# Patient Record
Sex: MALE | Race: WHITE | NOT HISPANIC OR LATINO | Employment: OTHER | URBAN - METROPOLITAN AREA
[De-identification: names, ages, dates, MRNs, and addresses within clinical notes are randomized per-mention and may not be internally consistent; named-entity substitution may affect disease eponyms.]

---

## 2018-08-29 ENCOUNTER — OFFICE VISIT (OUTPATIENT)
Dept: ORTHOPEDIC SURGERY | Facility: CLINIC | Age: 83
End: 2018-08-29

## 2018-08-29 VITALS — HEART RATE: 67 BPM | OXYGEN SATURATION: 98 % | WEIGHT: 173.06 LBS | BODY MASS INDEX: 24.78 KG/M2 | HEIGHT: 70 IN

## 2018-08-29 DIAGNOSIS — M16.11 PRIMARY OSTEOARTHRITIS OF RIGHT HIP: ICD-10-CM

## 2018-08-29 DIAGNOSIS — M70.61 TROCHANTERIC BURSITIS OF RIGHT HIP: Primary | ICD-10-CM

## 2018-08-29 PROCEDURE — 99203 OFFICE O/P NEW LOW 30 MIN: CPT | Performed by: ORTHOPAEDIC SURGERY

## 2018-08-29 PROCEDURE — 20610 DRAIN/INJ JOINT/BURSA W/O US: CPT | Performed by: ORTHOPAEDIC SURGERY

## 2018-08-29 RX ORDER — LISINOPRIL AND HYDROCHLOROTHIAZIDE 12.5; 1 MG/1; MG/1
TABLET ORAL
Refills: 2 | COMMUNITY
Start: 2018-07-23

## 2018-08-29 RX ORDER — LIDOCAINE HYDROCHLORIDE 10 MG/ML
4 INJECTION, SOLUTION INFILTRATION; PERINEURAL
Status: COMPLETED | OUTPATIENT
Start: 2018-08-29 | End: 2018-08-29

## 2018-08-29 RX ORDER — ATORVASTATIN CALCIUM 20 MG/1
TABLET, FILM COATED ORAL
Refills: 3 | COMMUNITY
Start: 2018-06-27

## 2018-08-29 RX ORDER — DICLOFENAC SODIUM 75 MG/1
TABLET, DELAYED RELEASE ORAL
COMMUNITY
Start: 2015-05-27

## 2018-08-29 RX ORDER — DOXAZOSIN MESYLATE 4 MG/1
TABLET ORAL
Refills: 3 | COMMUNITY
Start: 2018-06-27 | End: 2022-06-20

## 2018-08-29 RX ORDER — TRIAMCINOLONE ACETONIDE 40 MG/ML
40 INJECTION, SUSPENSION INTRA-ARTICULAR; INTRAMUSCULAR
Status: COMPLETED | OUTPATIENT
Start: 2018-08-29 | End: 2018-08-29

## 2018-08-29 RX ORDER — NITROGLYCERIN 0.4 MG/1
TABLET SUBLINGUAL
Refills: 3 | COMMUNITY
Start: 2018-06-12

## 2018-08-29 RX ORDER — BISOPROLOL FUMARATE 5 MG/1
TABLET, FILM COATED ORAL DAILY
Refills: 2 | COMMUNITY
Start: 2018-07-23

## 2018-08-29 RX ORDER — OMEPRAZOLE 40 MG/1
CAPSULE, DELAYED RELEASE ORAL
Refills: 0 | COMMUNITY
Start: 2018-07-23 | End: 2022-06-20

## 2018-08-29 RX ADMIN — TRIAMCINOLONE ACETONIDE 40 MG: 40 INJECTION, SUSPENSION INTRA-ARTICULAR; INTRAMUSCULAR at 11:03

## 2018-08-29 RX ADMIN — LIDOCAINE HYDROCHLORIDE 4 ML: 10 INJECTION, SOLUTION INFILTRATION; PERINEURAL at 11:03

## 2018-08-29 NOTE — PROGRESS NOTES
[facility] [place] [facility] Note    Subjective     Chief Complaint   Patient presents with   • Right Hip - Pain        HPI    Nicole Olvera is a 82 y.o. male.  He presents today for evaluation of right hip pain.  He does have pain in the lateral aspect of hip, as well as in the groin.  Pain is worsening over the past 3 months.  He is ambulatory without external aids.  Pain is mild to moderate currently, aching, burning and sharp, associated with stiffness, and worsens with walking.  He also has pain when he sleeps on his side at night.      There is no problem list on file for this patient.    Past Medical History:   Diagnosis Date   • Heart disease    • Hypertension    • Osteoarthritis       Past Surgical History:   Procedure Laterality Date   • CARDIAC SURGERY        Family History   Problem Relation Age of Onset   • Stroke Father    • Osteoarthritis Father      Social History     Social History   • Marital status:      Spouse name: N/A   • Number of children: N/A   • Years of education: N/A     Occupational History   • Not on file.     Social History Main Topics   • Smoking status: Former Smoker     Quit date: 1978   • Smokeless tobacco: Never Used   • Alcohol use Yes   • Drug use: No   • Sexual activity: Defer     Other Topics Concern   • Not on file     Social History Narrative   • No narrative on file      No current outpatient prescriptions on file prior to visit.     No current facility-administered medications on file prior to visit.       Allergies   Allergen Reactions   • Antihistamines, Chlorpheniramine-Type Other (See Comments)        Review of Systems   Constitutional: Positive for activity change and fatigue. Negative for appetite change, chills, diaphoresis, fever and unexpected weight change.   HENT: Positive for hearing loss and tinnitus. Negative for congestion, dental problem, drooling, ear discharge, ear pain, facial swelling, mouth sores, nosebleeds, postnasal drip, rhinorrhea,  "sinus pressure, sneezing, sore throat, trouble swallowing and voice change.    Eyes: Negative for photophobia, pain, discharge, redness, itching and visual disturbance.   Respiratory: Negative for apnea, cough, choking, chest tightness, shortness of breath, wheezing and stridor.    Cardiovascular: Positive for leg swelling. Negative for chest pain and palpitations.   Gastrointestinal: Negative for abdominal distention, abdominal pain, anal bleeding, blood in stool, constipation, diarrhea, nausea, rectal pain and vomiting.   Endocrine: Positive for cold intolerance. Negative for heat intolerance, polydipsia, polyphagia and polyuria.   Genitourinary: Negative for decreased urine volume, difficulty urinating, dysuria, enuresis, flank pain, frequency, genital sores, hematuria and urgency.   Musculoskeletal: Positive for back pain, gait problem and neck pain. Negative for arthralgias, joint swelling, myalgias and neck stiffness.   Skin: Negative for color change, pallor, rash and wound.   Allergic/Immunologic: Negative for environmental allergies, food allergies and immunocompromised state.   Neurological: Negative for dizziness, tremors, seizures, syncope, facial asymmetry, speech difficulty, weakness, light-headedness, numbness and headaches.   Hematological: Negative for adenopathy. Does not bruise/bleed easily.   Psychiatric/Behavioral: Negative for agitation, behavioral problems, confusion, decreased concentration, dysphoric mood, hallucinations, self-injury, sleep disturbance and suicidal ideas. The patient is not nervous/anxious and is not hyperactive.         Objective      Physical Exam  Pulse 67   Ht 176.5 cm (69.5\")   Wt 78.5 kg (173 lb 1 oz)   SpO2 98%   BMI 25.19 kg/m²     Body mass index is 25.19 kg/m².    General:   Mental Status:  Alert   Appearance: Cooperative, in no acute distress   Build and Nutrition: Well-nourished and well developed male   Orientation: Alert and oriented to person, place and " time   Posture: Normal   Gait: Normal    Integument:   Right hip: No skin lesions, no rash, no ecchymosis    Neurologic:   Sensation:    Right foot: Intact to light touch on the dorsal and plantar aspect   Motor:  Right lower extremity: 5/5 quadriceps, hamstrings, ankle dorsiflexors, and ankle plantar flexors    Vascular:   Right lower extremity: 2+ dorsalis pedis pulse, prompt capillary refill    Lower Extremities:   Right Hip:    Tenderness:  Tender over the lateral aspect of the hip    Swelling: None    Crepitus:  None    Atrophy:  None    Range of motion:  External Rotation: 30°       Internal Rotation: 30°       Flexion:  100°       Extension:  0°   Instability:  None  Deformities:  None  Functional testing: Positive [facility]    No leg length discrepancy        Imaging/Studies      Imaging Results (last 24 hours)     Procedure Component Value Units Date/Time    XR Hip With or Without Pelvis 1 View Right [[id number]] Resulted:  08/29/18 1045     Updated:  08/29/18 1046    Narrative:       Right Hip Radiographs  Indication: right hip pain  Views: low AP pelvis and lateral of the right hip    Comparison: no prior studies available for review    Findings:   Mild arthritic changes, with inferior acetabular osteophytes, joint space   narrowing, mild subchondral sclerosis, with no acute bony abnormalities.          Assessment and Plan     Nicole was seen today for pain.    Diagnoses and all orders for this visit:    Trochanteric bursitis of right hip  -     XR Hip With or Without Pelvis 1 View Right  -     Large Joint Arthrocentesis  -     lidocaine (XYLOCAINE) 1 % injection 4 mL; 4 mL Once.  -     triamcinolone acetonide (KENALOG-40) injection 40 mg; 40 mg Once.  -     Ambulatory Referral to Physical Therapy Evaluate and treat    Primary osteoarthritis of right hip        I reviewed my findings with patient today.  He has findings consistent with trochanteric bursitis, as well as mild arthritis in the hip.  At this  point, we will try some physical therapy and a trochanteric injection.  I will see him back in 6 weeks, but sooner for any problems.    Of note, he had 50% relief just a few minutes following the injection.    Return in about 6 weeks (around 10/10/2018).      Medical Decision Making  Management Options : prescription/IM medicine and physical/occupational therapy  Data/Risk: radiology tests and independent visualization of imaging, lab tests, or EMG/NCV      Fabien Howard MD  08/29/18  7:44 PM

## 2018-08-29 NOTE — PROGRESS NOTES
Procedure   Large Joint Arthrocentesis  Date/Time: 8/29/2018 11:03 AM  Consent given by: patient  Site marked: site marked  Timeout: Immediately prior to procedure a time out was called to verify the correct patient, procedure, equipment, support staff and site/side marked as required   Supporting Documentation  Indications: pain   Procedure Details  Location: hip - R greater trochanteric bursa  Preparation: Patient was prepped and draped in the usual sterile fashion  Needle size: 22 G  Approach: posterior  Medications administered: 40 mg triamcinolone acetonide 40 MG/ML; 4 mL lidocaine 1 %  Patient tolerance: patient tolerated the procedure well with no immediate complications

## 2018-10-29 ENCOUNTER — OFFICE VISIT (OUTPATIENT)
Dept: ORTHOPEDIC SURGERY | Facility: CLINIC | Age: 83
End: 2018-10-29

## 2018-10-29 VITALS — WEIGHT: 163.14 LBS | OXYGEN SATURATION: 98 % | HEIGHT: 69 IN | HEART RATE: 70 BPM | BODY MASS INDEX: 24.16 KG/M2

## 2018-10-29 DIAGNOSIS — M70.61 TROCHANTERIC BURSITIS OF RIGHT HIP: Primary | ICD-10-CM

## 2018-10-29 PROCEDURE — 99212 OFFICE O/P EST SF 10 MIN: CPT | Performed by: ORTHOPAEDIC SURGERY

## 2018-10-29 NOTE — PROGRESS NOTES
[facility] [place] [facility] [place]    Subjective     Chief Complaint   Patient presents with   • Right Hip - Follow-up     9 weeks        HPI    Nicole Olvera is a 83 y.o. male.  He follows up today for his right hip.  He responded well to the trochanteric injection as well as physical therapy.  No pain.      There is no problem list on file for this patient.    Past Medical History:   Diagnosis Date   • Heart disease    • Hypertension    • Osteoarthritis       Past Surgical History:   Procedure Laterality Date   • CARDIAC SURGERY        Family History   Problem Relation Age of Onset   • Stroke Father    • Osteoarthritis Father      Social History     Social History   • Marital status:      Spouse name: N/A   • Number of children: N/A   • Years of education: N/A     Occupational History   • Not on file.     Social History Main Topics   • Smoking status: Former Smoker     Quit date: 1978   • Smokeless tobacco: Never Used   • Alcohol use Yes   • Drug use: No   • Sexual activity: Defer     Other Topics Concern   • Not on file     Social History Narrative   • No narrative on file      Current Outpatient Prescriptions on File Prior to Visit   Medication Sig Dispense Refill   • atorvastatin (LIPITOR) 20 MG tablet TK 1 T PO QHS  3   • bisoprolol (ZEBeta) 5 MG tablet Take  by mouth Daily.  2   • diclofenac (VOLTAREN) 75 MG EC tablet diclofenac sodium 75 mg tablet,delayed release   Two times a day     • doxazosin (CARDURA) 4 MG tablet TK ONE T PO QD  3   • lisinopril-hydrochlorothiazide (PRINZIDE,ZESTORETIC) 10-12.5 MG per tablet TK ONE T PO QD  2   • nitroglycerin (NITROSTAT) 0.4 MG SL tablet TK 1 T SUBLINGUALLY AT ONSET OF CHEST PAIN AND REPEAT Q 5 MINUTES FOR 3 DOSES IF NEEDED  3   • omeprazole (priLOSEC) 40 MG capsule TK 1 C PO QD  0     No current facility-administered medications on file prior to visit.       Allergies   Allergen Reactions   • Antihistamines, Chlorpheniramine-Type Other (See Comments)         Review of Systems   Constitutional: Negative for activity change, appetite change, chills, diaphoresis, fatigue, fever and unexpected weight change.   HENT: Negative for congestion, dental problem, drooling, ear discharge, ear pain, facial swelling, hearing loss, mouth sores, nosebleeds, postnasal drip, rhinorrhea, sinus pressure, sneezing, sore throat, tinnitus, trouble swallowing and voice change.    Eyes: Negative for photophobia, pain, discharge, redness, itching and visual disturbance.   Respiratory: Negative for apnea, cough, choking, chest tightness, shortness of breath, wheezing and stridor.    Cardiovascular: Negative for chest pain, palpitations and leg swelling.   Gastrointestinal: Negative for abdominal distention, abdominal pain, anal bleeding, blood in stool, constipation, diarrhea, nausea, rectal pain and vomiting.   Endocrine: Negative for cold intolerance, heat intolerance, polydipsia, polyphagia and polyuria.   Genitourinary: Negative for decreased urine volume, difficulty urinating, dysuria, enuresis, flank pain, frequency, genital sores, hematuria and urgency.   Musculoskeletal: Positive for arthralgias. Negative for back pain, gait problem, joint swelling, myalgias, neck pain and neck stiffness.   Skin: Negative for color change, pallor, rash and wound.   Allergic/Immunologic: Negative for environmental allergies, food allergies and immunocompromised state.   Neurological: Negative for dizziness, tremors, seizures, syncope, facial asymmetry, speech difficulty, weakness, light-headedness, numbness and headaches.   Hematological: Negative for adenopathy. Does not bruise/bleed easily.   Psychiatric/Behavioral: Negative for agitation, behavioral problems, confusion, decreased concentration, dysphoric mood, hallucinations, self-injury, sleep disturbance and suicidal ideas. The patient is not nervous/anxious and is not hyperactive.         Objective      Physical Exam  Pulse 70   Ht 176.5 cm  "(69.49\")   Wt 74 kg (163 lb 2.3 oz)   SpO2 98%   BMI 23.75 kg/m²     Body mass index is 23.75 kg/m².    General:   Mental Status:  Alert   Appearance: Cooperative, in no acute distress   Build and Nutrition: Well-nourished and well developed male   Orientation: Alert and oriented to person, place and time   Posture: Normal   Gait: Normal    Lower Extremity:   Right Hip:    Tenderness:  None    Swelling:  None    Crepitus:  None    Range of motion:  External Rotation: 30°       Internal Rotation: 30°       Flexion:  100°       Extension:  0°    Deformities:  None  Functional testing: Negative [facility] leg length discrepancy        Assessment and Plan     Nicole was seen today for follow-up.    Diagnoses and all orders for this visit:    Trochanteric bursitis of right hip        I reviewed my findings with patient today.  Right hip is doing well, and he should continue with maintenance exercises.  I will see him back if there are any problems with this hip in the future.  Repeat injection can be considered if he has recurrent symptoms.    Return if symptoms worsen or fail to improve.        Fabien Howard MD  10/29/18  9:05 AM            "

## 2019-03-13 ENCOUNTER — OFFICE VISIT (OUTPATIENT)
Dept: ORTHOPEDIC SURGERY | Facility: CLINIC | Age: 84
End: 2019-03-13

## 2019-03-13 VITALS — OXYGEN SATURATION: 98 % | BODY MASS INDEX: 24.82 KG/M2 | HEIGHT: 69 IN | HEART RATE: 56 BPM | WEIGHT: 167.55 LBS

## 2019-03-13 DIAGNOSIS — M70.61 TROCHANTERIC BURSITIS OF RIGHT HIP: Primary | ICD-10-CM

## 2019-03-13 PROCEDURE — 20610 DRAIN/INJ JOINT/BURSA W/O US: CPT | Performed by: ORTHOPAEDIC SURGERY

## 2019-03-13 RX ORDER — LIDOCAINE HYDROCHLORIDE 10 MG/ML
4 INJECTION, SOLUTION INFILTRATION; PERINEURAL
Status: COMPLETED | OUTPATIENT
Start: 2019-03-13 | End: 2019-03-13

## 2019-03-13 RX ORDER — TRIAMCINOLONE ACETONIDE 40 MG/ML
40 INJECTION, SUSPENSION INTRA-ARTICULAR; INTRAMUSCULAR
Status: COMPLETED | OUTPATIENT
Start: 2019-03-13 | End: 2019-03-13

## 2019-03-13 RX ADMIN — TRIAMCINOLONE ACETONIDE 40 MG: 40 INJECTION, SUSPENSION INTRA-ARTICULAR; INTRAMUSCULAR at 16:27

## 2019-03-13 RX ADMIN — LIDOCAINE HYDROCHLORIDE 4 ML: 10 INJECTION, SOLUTION INFILTRATION; PERINEURAL at 16:27

## 2019-03-13 NOTE — PROGRESS NOTES
[facility] [place] [facility]    Subjective     Chief Complaint   Patient presents with   • Follow-up     5 month f/u Right Hip pain (injection given 18)        TASHIA Olvera is a 83 y.o. male.  He follows up today for his right hip.  He is having pain on the lateral aspect of the hip, and had good relief of the trochanteric injection in 2018.  He would like to have another injection today.      There is no problem list on file for this patient.    Past Medical History:   Diagnosis Date   • Heart disease    • Hypertension    • Osteoarthritis       Past Surgical History:   Procedure Laterality Date   • CARDIAC SURGERY        Family History   Problem Relation Age of Onset   • Stroke Father    • Osteoarthritis Father      Social History     Socioeconomic History   • Marital status:      Spouse name: Not on file   • Number of children: Not on file   • Years of education: Not on file   • Highest education level: Not on file   Social Needs   • Financial resource strain: Not on file   • Food insecurity - worry: Not on file   • Food insecurity - inability: Not on file   • Transportation needs - medical: Not on file   • Transportation needs - non-medical: Not on file   Occupational History   • Not on file   Tobacco Use   • Smoking status: Former Smoker     Last attempt to quit: 1978     Years since quittin.2   • Smokeless tobacco: Never Used   Substance and Sexual Activity   • Alcohol use: Yes   • Drug use: No   • Sexual activity: Defer   Other Topics Concern   • Not on file   Social History Narrative   • Not on file      Current Outpatient Medications on File Prior to Visit   Medication Sig Dispense Refill   • atorvastatin (LIPITOR) 20 MG tablet TK 1 T PO QHS  3   • bisoprolol (ZEBeta) 5 MG tablet Take  by mouth Daily.  2   • diclofenac (VOLTAREN) 75 MG EC tablet diclofenac sodium 75 mg tablet,delayed release   Two times a day     • doxazosin (CARDURA) 4 MG tablet TK ONE T PO QD  3    • lisinopril-hydrochlorothiazide (PRINZIDE,ZESTORETIC) 10-12.5 MG per tablet TK ONE T PO QD  2   • nitroglycerin (NITROSTAT) 0.4 MG SL tablet TK 1 T SUBLINGUALLY AT ONSET OF CHEST PAIN AND REPEAT Q 5 MINUTES FOR 3 DOSES IF NEEDED  3   • omeprazole (priLOSEC) 40 MG capsule TK 1 C PO QD  0     No current facility-administered medications on file prior to visit.       Allergies   Allergen Reactions   • Antihistamines, Chlorpheniramine-Type Other (See Comments)        Review of Systems   Constitutional: Negative for activity change, appetite change, chills, diaphoresis, fatigue, fever and unexpected weight change.   HENT: Negative for congestion, dental problem, drooling, ear discharge, ear pain, facial swelling, hearing loss, mouth sores, nosebleeds, postnasal drip, rhinorrhea, sinus pressure, sneezing, sore throat, tinnitus, trouble swallowing and voice change.    Eyes: Negative for photophobia, pain, discharge, redness, itching and visual disturbance.   Respiratory: Negative for apnea, cough, choking, chest tightness, shortness of breath, wheezing and stridor.    Cardiovascular: Negative for chest pain, palpitations and leg swelling.   Gastrointestinal: Negative for abdominal distention, abdominal pain, anal bleeding, blood in stool, constipation, diarrhea, nausea, rectal pain and vomiting.   Endocrine: Negative for cold intolerance, heat intolerance, polydipsia, polyphagia and polyuria.   Genitourinary: Negative for decreased urine volume, difficulty urinating, dysuria, enuresis, flank pain, frequency, genital sores, hematuria and urgency.   Musculoskeletal: Positive for arthralgias (hip pain). Negative for back pain, gait problem, joint swelling, myalgias, neck pain and neck stiffness.   Skin: Negative for color change, pallor, rash and wound.   Allergic/Immunologic: Negative for environmental allergies, food allergies and immunocompromised state.   Neurological: Negative for dizziness, tremors, seizures,  "syncope, facial asymmetry, speech difficulty, weakness, light-headedness, numbness and headaches.   Hematological: Negative for adenopathy. Does not bruise/bleed easily.   Psychiatric/Behavioral: Negative for agitation, behavioral problems, confusion, decreased concentration, dysphoric mood, hallucinations, self-injury, sleep disturbance and suicidal ideas. The patient is not nervous/anxious and is not hyperactive.         Objective      Physical Exam  Pulse 56   Ht 176.5 cm (69.49\")   Wt 76 kg (167 lb 8.8 oz)   SpO2 98%   BMI 24.40 kg/m²     Body mass index is 24.4 kg/m².    General:   Mental Status:  Alert   Appearance: Cooperative, in no acute distress   Build and Nutrition: Well-nourished well-developed male   Orientation: Alert and oriented to person, place and time   Posture: Normal    Integument:   Right hip: No skin lesions, no rash, no ecchymosis    Lower Extremity:   Right Hip:    Tenderness:  Tender over the greater trochanter    Swelling:  None    Crepitus:  None    Range of motion:  External Rotation: 30°       Internal Rotation: 30°       Flexion:  100°       Extension:  0°    Deformities:  None  Functional testing: Negative [facility]    No leg length discrepancy        Assessment and Plan     Nicole was seen today for follow-up.    Diagnoses and all orders for this visit:    Trochanteric bursitis of right hip  -     Large Joint Arthrocentesis: R greater trochanteric bursa        1. Trochanteric bursitis of right hip        I reviewed my findings with the patient today.  He would like to have another injection for trochanteric bursitis today, and this was provided.  Of note, he had 60% relief just a few minutes following the injection.  I will see him back in 4 months, but sooner for any problems.    Return in about 4 months (around 7/13/2019).      Medical Decision Making  Management Options : prescription/IM medicine      Fabien Howard MD  03/13/19  4:54 PM  "

## 2019-03-13 NOTE — PROGRESS NOTES
Procedure   Large Joint Arthrocentesis: R greater trochanteric bursa  Date/Time: 3/13/2019 4:27 PM  Consent given by: patient  Site marked: site marked  Timeout: Immediately prior to procedure a time out was called to verify the correct patient, procedure, equipment, support staff and site/side marked as required   Supporting Documentation  Indications: pain   Procedure Details  Location: hip - R greater trochanteric bursa  Preparation: Patient was prepped and draped in the usual sterile fashion  Needle size: 22 G  Approach: anterolateral  Medications administered: 4 mL lidocaine 1 %; 40 mg triamcinolone acetonide 40 MG/ML

## 2019-08-28 ENCOUNTER — OFFICE VISIT (OUTPATIENT)
Dept: ORTHOPEDIC SURGERY | Facility: CLINIC | Age: 84
End: 2019-08-28

## 2019-08-28 VITALS — HEART RATE: 96 BPM | BODY MASS INDEX: 22.86 KG/M2 | HEIGHT: 69 IN | WEIGHT: 154.32 LBS | OXYGEN SATURATION: 98 %

## 2019-08-28 DIAGNOSIS — M70.61 TROCHANTERIC BURSITIS OF RIGHT HIP: Primary | ICD-10-CM

## 2019-08-28 PROCEDURE — 99213 OFFICE O/P EST LOW 20 MIN: CPT | Performed by: ORTHOPAEDIC SURGERY

## 2019-08-28 PROCEDURE — 20610 DRAIN/INJ JOINT/BURSA W/O US: CPT | Performed by: ORTHOPAEDIC SURGERY

## 2019-08-28 RX ORDER — LIDOCAINE HYDROCHLORIDE 10 MG/ML
4 INJECTION, SOLUTION EPIDURAL; INFILTRATION; INTRACAUDAL; PERINEURAL
Status: COMPLETED | OUTPATIENT
Start: 2019-08-28 | End: 2019-08-28

## 2019-08-28 RX ORDER — TRIAMCINOLONE ACETONIDE 40 MG/ML
40 INJECTION, SUSPENSION INTRA-ARTICULAR; INTRAMUSCULAR
Status: COMPLETED | OUTPATIENT
Start: 2019-08-28 | End: 2019-08-28

## 2019-08-28 RX ORDER — LEVOTHYROXINE SODIUM 0.03 MG/1
TABLET ORAL
COMMUNITY
End: 2022-06-20

## 2019-08-28 RX ORDER — POTASSIUM CHLORIDE 750 MG/1
TABLET, FILM COATED, EXTENDED RELEASE ORAL DAILY
Refills: 2 | COMMUNITY
Start: 2019-07-24

## 2019-08-28 RX ORDER — FUROSEMIDE 40 MG/1
TABLET ORAL
COMMUNITY

## 2019-08-28 RX ADMIN — TRIAMCINOLONE ACETONIDE 40 MG: 40 INJECTION, SUSPENSION INTRA-ARTICULAR; INTRAMUSCULAR at 15:10

## 2019-08-28 RX ADMIN — LIDOCAINE HYDROCHLORIDE 4 ML: 10 INJECTION, SOLUTION EPIDURAL; INFILTRATION; INTRACAUDAL; PERINEURAL at 15:10

## 2019-08-28 NOTE — PROGRESS NOTES
[facility] [place] [facility] Note    Subjective     Chief Complaint   Patient presents with   • Right Hip - Follow-up, Pain     Trochanteric Bursitis of Right Hip  Cortisone Injection given 2019         HPI    Nicole Olvera is a 83 y.o. male.  He follows up today for his right hip.  He would like to have an injection today if possible.  Previous injection did help.  Pain is currently 3 out of 10, located on the lateral aspect of the hip, which is dull in quality, and worsens with walking.      There is no problem list on file for this patient.    Past Medical History:   Diagnosis Date   • Heart disease    • Hypertension    • Osteoarthritis       Past Surgical History:   Procedure Laterality Date   • CARDIAC SURGERY        Family History   Problem Relation Age of Onset   • Stroke Father    • Osteoarthritis Father      Social History     Socioeconomic History   • Marital status:      Spouse name: Not on file   • Number of children: Not on file   • Years of education: Not on file   • Highest education level: Not on file   Tobacco Use   • Smoking status: Former Smoker     Last attempt to quit:      Years since quittin.6   • Smokeless tobacco: Never Used   Substance and Sexual Activity   • Alcohol use: Yes   • Drug use: No   • Sexual activity: Defer      Current Outpatient Medications on File Prior to Visit   Medication Sig Dispense Refill   • atorvastatin (LIPITOR) 20 MG tablet TK 1 T PO QHS  3   • bisoprolol (ZEBeta) 5 MG tablet Take  by mouth Daily.  2   • CoenzymeQ10-Isoleucine-Glycine (CO Q-10) 100-50-25 MG tablet sustained-release 24 hour Co Q-10   qd     • diclofenac (VOLTAREN) 75 MG EC tablet diclofenac sodium 75 mg tablet,delayed release   Two times a day     • doxazosin (CARDURA) 4 MG tablet TK ONE T PO QD  3   • furosemide (LASIX) 40 MG tablet furosemide 40 mg tablet     • levothyroxine (SYNTHROID, LEVOTHROID) 25 MCG tablet levothyroxine 25 mcg tablet   TAKE 1 TABLET BY MOUTH EVERY  DAY     • lisinopril-hydrochlorothiazide (PRINZIDE,ZESTORETIC) 10-12.5 MG per tablet TK ONE T PO QD  2   • nitroglycerin (NITROSTAT) 0.4 MG SL tablet TK 1 T SUBLINGUALLY AT ONSET OF CHEST PAIN AND REPEAT Q 5 MINUTES FOR 3 DOSES IF NEEDED  3   • omeprazole (priLOSEC) 40 MG capsule TK 1 C PO QD  0   • potassium chloride (K-DUR) 10 MEQ CR tablet Take  by mouth Daily.  2     No current facility-administered medications on file prior to visit.       Allergies   Allergen Reactions   • Antihistamines, Chlorpheniramine-Type Other (See Comments)        Review of Systems   Constitutional: Negative.  Negative for activity change, appetite change, chills, diaphoresis, fatigue, fever and unexpected weight change.   HENT: Negative.  Negative for congestion, dental problem, drooling, ear discharge, ear pain, facial swelling, hearing loss, mouth sores, nosebleeds, postnasal drip, rhinorrhea, sinus pressure, sneezing, sore throat, tinnitus, trouble swallowing and voice change.    Eyes: Negative.  Negative for photophobia, pain, discharge, redness, itching and visual disturbance.   Respiratory: Negative.  Negative for apnea, cough, choking, chest tightness, shortness of breath, wheezing and stridor.    Cardiovascular: Negative.  Negative for chest pain, palpitations and leg swelling.   Gastrointestinal: Negative.  Negative for abdominal distention, abdominal pain, anal bleeding, blood in stool, constipation, diarrhea, nausea, rectal pain and vomiting.   Endocrine: Negative.  Negative for cold intolerance, heat intolerance, polydipsia, polyphagia and polyuria.   Genitourinary: Negative.  Negative for decreased urine volume, difficulty urinating, dysuria, enuresis, flank pain, frequency, genital sores, hematuria and urgency.   Musculoskeletal: Positive for arthralgias. Negative for back pain, gait problem, joint swelling, myalgias, neck pain and neck stiffness.   Skin: Negative.  Negative for color change, pallor, rash and wound.  "  Allergic/Immunologic: Negative.  Negative for environmental allergies, food allergies and immunocompromised state.   Neurological: Negative.  Negative for dizziness, tremors, seizures, syncope, facial asymmetry, speech difficulty, weakness, light-headedness, numbness and headaches.   Hematological: Negative.  Negative for adenopathy. Does not bruise/bleed easily.   Psychiatric/Behavioral: Negative.  Negative for agitation, behavioral problems, confusion, decreased concentration, dysphoric mood, hallucinations, self-injury, sleep disturbance and suicidal ideas. The patient is not nervous/anxious and is not hyperactive.         Objective      Physical Exam  Pulse 96   Ht 176.5 cm (69.49\")   Wt 70 kg (154 lb 5.2 oz)   SpO2 98%   BMI 22.47 kg/m²     Body mass index is 22.47 kg/m².    General:   Mental Status:  Alert   Appearance: Cooperative, in no acute distress   Build and Nutrition: Well-nourished well-developed male   Orientation: Alert and oriented to person, place and time   Posture: Normal   Gait: Normal    Integument:   Right hip: No skin lesions, no rash, no ecchymosis    Lower Extremity:   Right Hip:    Tenderness:  Lateral tenderness, mild    Swelling:  None    Crepitus:  None    Range of motion:  External Rotation: 30°       Internal Rotation: 30°       Flexion:  100°       Extension:  0°    Deformities:  None  Functional testing: Negative [facility]    No leg length discrepancy      Imaging/Studies  Imaging Results (last 24 hours)     Procedure Component Value Units Date/Time    XR Hip With or Without Pelvis 1 View Right [[id number]] Resulted:  08/28/19 1503     Updated:  08/28/19 1503    Narrative:       Right Hip Radiographs  Indication: right hip pain  Views: low AP pelvis and lateral of the right hip    Comparison: 8/29/2018    Findings:   No significant changes compared to previous films, with minor degeneration   in the hip, with good alignment.  No acute bony abnormalities.    "         Assessment and Plan     Nicole was seen today for follow-up and pain.    Diagnoses and all orders for this visit:    Trochanteric bursitis of right hip  -     XR Hip With or Without Pelvis 1 View Right        1. Trochanteric bursitis of right hip        I reviewed my findings with patient today.  He is bothered with right hip bursitis, would like to have an injection today, which was provided.  I will see him back in 4 months, but sooner for any problems.    Of note, he had 10% relief just a few minutes following the injection today.    Return in about 4 months (around 12/28/2019).      Medical Decision Making  Management Options : prescription/IM medicine  Data/Risk: radiology tests and independent visualization of imaging, lab tests, or EMG/NCV      Fabien Howard MD  08/28/19  3:09 PM

## 2019-08-28 NOTE — PROGRESS NOTES
Procedure   Large Joint Arthrocentesis: R greater trochanteric bursa  Date/Time: 8/28/2019 3:10 PM  Consent given by: patient  Site marked: site marked  Timeout: Immediately prior to procedure a time out was called to verify the correct patient, procedure, equipment, support staff and site/side marked as required   Supporting Documentation  Indications: pain   Procedure Details  Location: hip - R greater trochanteric bursa  Preparation: Patient was prepped and draped in the usual sterile fashion  Needle size: 22 G  Approach: anterolateral  Medications administered: 4 mL lidocaine PF 1% 1 %; 40 mg triamcinolone acetonide 40 MG/ML  Patient tolerance: patient tolerated the procedure well with no immediate complications

## 2019-12-02 ENCOUNTER — OFFICE VISIT (OUTPATIENT)
Dept: ORTHOPEDIC SURGERY | Facility: CLINIC | Age: 84
End: 2019-12-02

## 2019-12-02 VITALS — BODY MASS INDEX: 23.71 KG/M2 | HEIGHT: 69 IN | WEIGHT: 160.05 LBS | HEART RATE: 62 BPM | OXYGEN SATURATION: 98 %

## 2019-12-02 DIAGNOSIS — M70.61 TROCHANTERIC BURSITIS OF RIGHT HIP: Primary | ICD-10-CM

## 2019-12-02 PROCEDURE — 20610 DRAIN/INJ JOINT/BURSA W/O US: CPT | Performed by: ORTHOPAEDIC SURGERY

## 2019-12-02 RX ORDER — TRIAMCINOLONE ACETONIDE 40 MG/ML
40 INJECTION, SUSPENSION INTRA-ARTICULAR; INTRAMUSCULAR
Status: COMPLETED | OUTPATIENT
Start: 2019-12-02 | End: 2019-12-02

## 2019-12-02 RX ORDER — BRIMONIDINE TARTRATE 2 MG/ML
SOLUTION/ DROPS OPHTHALMIC
Refills: 2 | COMMUNITY
Start: 2019-09-20

## 2019-12-02 RX ORDER — PREDNISOLONE ACETATE 10 MG/ML
SUSPENSION/ DROPS OPHTHALMIC
Refills: 2 | COMMUNITY
Start: 2019-11-08

## 2019-12-02 RX ORDER — KETOROLAC TROMETHAMINE 5 MG/ML
SOLUTION OPHTHALMIC
Refills: 2 | COMMUNITY
Start: 2019-11-08

## 2019-12-02 RX ORDER — ROPIVACAINE HYDROCHLORIDE 5 MG/ML
4 INJECTION, SOLUTION EPIDURAL; INFILTRATION; PERINEURAL
Status: COMPLETED | OUTPATIENT
Start: 2019-12-02 | End: 2019-12-02

## 2019-12-02 RX ADMIN — TRIAMCINOLONE ACETONIDE 40 MG: 40 INJECTION, SUSPENSION INTRA-ARTICULAR; INTRAMUSCULAR at 14:59

## 2019-12-02 RX ADMIN — ROPIVACAINE HYDROCHLORIDE 4 ML: 5 INJECTION, SOLUTION EPIDURAL; INFILTRATION; PERINEURAL at 14:59

## 2019-12-02 NOTE — PROGRESS NOTES
[facility] [place] [facility] [place]    Subjective     Chief Complaint   Patient presents with   • Follow-up     4 month follow up - Trochanteric bursitis of right hip - injection given 19        HPI    Nicole Olvera is a 84 y.o. male.  He follows up today for his right hip.  Last injection provided relief up until just a few weeks ago, and he would like to have a repeat injection today.  Previous injections have provided good relief.      There is no problem list on file for this patient.    Past Medical History:   Diagnosis Date   • Heart disease    • Hypertension    • Osteoarthritis       Past Surgical History:   Procedure Laterality Date   • CARDIAC SURGERY     • CATARACT EXTRACTION, BILATERAL        Family History   Problem Relation Age of Onset   • Stroke Father    • Osteoarthritis Father      Social History     Socioeconomic History   • Marital status:      Spouse name: Not on file   • Number of children: Not on file   • Years of education: Not on file   • Highest education level: Not on file   Tobacco Use   • Smoking status: Former Smoker     Last attempt to quit:      Years since quittin.9   • Smokeless tobacco: Never Used   Substance and Sexual Activity   • Alcohol use: Yes   • Drug use: No   • Sexual activity: Defer      Current Outpatient Medications on File Prior to Visit   Medication Sig Dispense Refill   • atorvastatin (LIPITOR) 20 MG tablet TK 1 T PO QHS  3   • bisoprolol (ZEBeta) 5 MG tablet Take  by mouth Daily.  2   • brimonidine (ALPHAGAN) 0.2 % ophthalmic solution INSTILL 1 DROP IN BOTH EYES BID  2   • CoenzymeQ10-Isoleucine-Glycine (CO Q-10) 100-50-25 MG tablet sustained-release 24 hour Co Q-10   qd     • diclofenac (VOLTAREN) 75 MG EC tablet diclofenac sodium 75 mg tablet,delayed release   Two times a day     • doxazosin (CARDURA) 4 MG tablet TK ONE T PO QD  3   • furosemide (LASIX) 40 MG tablet furosemide 40 mg tablet     • ketorolac (ACULAR) 0.5 % ophthalmic  solution   2   • levothyroxine (SYNTHROID, LEVOTHROID) 25 MCG tablet levothyroxine 25 mcg tablet   TAKE 1 TABLET BY MOUTH EVERY DAY     • lisinopril-hydrochlorothiazide (PRINZIDE,ZESTORETIC) 10-12.5 MG per tablet TK ONE T PO QD  2   • Nepafenac (ILEVRO) 0.3 % suspension Ilevro 0.3 % eye drops,suspension     • nitroglycerin (NITROSTAT) 0.4 MG SL tablet TK 1 T SUBLINGUALLY AT ONSET OF CHEST PAIN AND REPEAT Q 5 MINUTES FOR 3 DOSES IF NEEDED  3   • omeprazole (priLOSEC) 40 MG capsule TK 1 C PO QD  0   • potassium chloride (K-DUR) 10 MEQ CR tablet Take  by mouth Daily.  2   • prednisoLONE acetate (PRED FORTE) 1 % ophthalmic suspension   2     No current facility-administered medications on file prior to visit.       Allergies   Allergen Reactions   • Antihistamines, Chlorpheniramine-Type Other (See Comments)        Review of Systems   Constitutional: Negative for activity change, appetite change, chills, diaphoresis, fatigue, fever and unexpected weight change.   HENT: Negative for congestion, dental problem, drooling, ear discharge, ear pain, facial swelling, hearing loss, mouth sores, nosebleeds, postnasal drip, rhinorrhea, sinus pressure, sneezing, sore throat, tinnitus, trouble swallowing and voice change.    Eyes: Negative for photophobia, pain, discharge, redness, itching and visual disturbance.   Respiratory: Negative for apnea, cough, choking, chest tightness, shortness of breath, wheezing and stridor.    Cardiovascular: Negative for chest pain, palpitations and leg swelling.   Gastrointestinal: Negative for abdominal distention, abdominal pain, anal bleeding, blood in stool, constipation, diarrhea, nausea, rectal pain and vomiting.   Endocrine: Negative for cold intolerance, heat intolerance, polydipsia, polyphagia and polyuria.   Genitourinary: Negative for decreased urine volume, difficulty urinating, dysuria, enuresis, flank pain, frequency, genital sores, hematuria and urgency.   Musculoskeletal: Positive  "for joint swelling. Negative for arthralgias, back pain, gait problem, myalgias, neck pain and neck stiffness.   Skin: Negative for color change, pallor, rash and wound.   Allergic/Immunologic: Negative for environmental allergies, food allergies and immunocompromised state.   Neurological: Negative for dizziness, tremors, seizures, syncope, facial asymmetry, speech difficulty, weakness, light-headedness, numbness and headaches.   Hematological: Negative for adenopathy. Does not bruise/bleed easily.   Psychiatric/Behavioral: Negative for agitation, behavioral problems, confusion, decreased concentration, dysphoric mood, hallucinations, self-injury, sleep disturbance and suicidal ideas. The patient is not nervous/anxious and is not hyperactive.         Objective      Physical Exam  Pulse 62   Ht 176.5 cm (69.49\")   Wt 72.6 kg (160 lb 0.9 oz)   SpO2 98%   BMI 23.30 kg/m²     Body mass index is 23.3 kg/m².    General:   Mental Status:  Alert   Appearance: Cooperative, in no acute distress   Build and Nutrition: Well-nourished well-developed male   Orientation: Alert and oriented to person, place and time   Posture: Normal   Gait: Normal    Integument:   Right hip: No skin lesions, no rash, no ecchymosis    Lower Extremity:   Right Hip:    Tenderness:  Over the lateral aspect of the hip    Swelling:  None    Crepitus:  None    Range of motion:  External Rotation: 30°       Internal Rotation: 30°       Flexion:  100°       Extension:  0°    Deformities:  None  Functional testing: Negative [facility]    No leg length discrepancy      Assessment and Plan     Nicole was seen today for follow-up.    Diagnoses and all orders for this visit:    Trochanteric bursitis of right hip  -     Large Joint Arthrocentesis: R greater trochanteric bursa  -     Ambulatory Referral to Physical Therapy Evaluate and treat        1. Trochanteric bursitis of right hip        I reviewed my findings with patient today.  He continues to be " bothered with right hip trochanteric bursitis, and we will repeat an injection today.  I will also send him for physical therapy, and a referral was provided.  I will see him back in 4 months, but sooner for any problems.    Of note, he had 10% improvement just a few minutes following the injection today.    Return in about 4 months (around 4/2/2020).      Medical Decision Making  Management Options : prescription/IM medicine and physical/occupational therapy      Fabien Howard MD  12/02/19  3:17 PM

## 2019-12-02 NOTE — PROGRESS NOTES
Procedure   Large Joint Arthrocentesis: R greater trochanteric bursa  Date/Time: 12/2/2019 2:59 PM  Consent given by: patient  Site marked: site marked  Timeout: Immediately prior to procedure a time out was called to verify the correct patient, procedure, equipment, support staff and site/side marked as required   Supporting Documentation  Indications: pain   Procedure Details  Location: hip - R greater trochanteric bursa  Preparation: Patient was prepped and draped in the usual sterile fashion  Needle size: 22 G  Approach: anterolateral  Medications administered: 40 mg triamcinolone acetonide 40 MG/ML; 4 mL ropivacaine 0.5 %  Patient tolerance: patient tolerated the procedure well with no immediate complications

## 2020-02-19 DIAGNOSIS — Z12.11 SCREENING FOR COLON CANCER: Primary | ICD-10-CM

## 2020-02-26 ENCOUNTER — LAB REQUISITION (OUTPATIENT)
Dept: LAB | Facility: HOSPITAL | Age: 85
End: 2020-02-26

## 2020-02-26 ENCOUNTER — OUTSIDE FACILITY SERVICE (OUTPATIENT)
Dept: GASTROENTEROLOGY | Facility: CLINIC | Age: 85
End: 2020-02-26

## 2020-02-26 DIAGNOSIS — Z12.11 ENCOUNTER FOR SCREENING FOR MALIGNANT NEOPLASM OF COLON: ICD-10-CM

## 2020-02-26 PROCEDURE — 45385 COLONOSCOPY W/LESION REMOVAL: CPT | Performed by: INTERNAL MEDICINE

## 2020-02-26 PROCEDURE — 45380 COLONOSCOPY AND BIOPSY: CPT | Performed by: INTERNAL MEDICINE

## 2020-02-26 PROCEDURE — 88305 TISSUE EXAM BY PATHOLOGIST: CPT | Performed by: INTERNAL MEDICINE

## 2020-02-27 LAB
CYTO UR: NORMAL
LAB AP CASE REPORT: NORMAL
LAB AP CLINICAL INFORMATION: NORMAL
PATH REPORT.FINAL DX SPEC: NORMAL
PATH REPORT.GROSS SPEC: NORMAL

## 2020-06-03 ENCOUNTER — OFFICE VISIT (OUTPATIENT)
Dept: ORTHOPEDIC SURGERY | Facility: CLINIC | Age: 85
End: 2020-06-03

## 2020-06-03 VITALS — BODY MASS INDEX: 25.06 KG/M2 | OXYGEN SATURATION: 97 % | HEART RATE: 58 BPM | WEIGHT: 169.2 LBS | HEIGHT: 69 IN

## 2020-06-03 DIAGNOSIS — M70.61 TROCHANTERIC BURSITIS OF RIGHT HIP: Primary | ICD-10-CM

## 2020-06-03 PROCEDURE — 20610 DRAIN/INJ JOINT/BURSA W/O US: CPT | Performed by: ORTHOPAEDIC SURGERY

## 2020-06-03 RX ORDER — TRIAMCINOLONE ACETONIDE 40 MG/ML
40 INJECTION, SUSPENSION INTRA-ARTICULAR; INTRAMUSCULAR
Status: COMPLETED | OUTPATIENT
Start: 2020-06-03 | End: 2020-06-03

## 2020-06-03 RX ORDER — ROPIVACAINE HYDROCHLORIDE 5 MG/ML
4 INJECTION, SOLUTION EPIDURAL; INFILTRATION; PERINEURAL
Status: COMPLETED | OUTPATIENT
Start: 2020-06-03 | End: 2020-06-03

## 2020-06-03 RX ADMIN — ROPIVACAINE HYDROCHLORIDE 4 ML: 5 INJECTION, SOLUTION EPIDURAL; INFILTRATION; PERINEURAL at 16:26

## 2020-06-03 RX ADMIN — TRIAMCINOLONE ACETONIDE 40 MG: 40 INJECTION, SUSPENSION INTRA-ARTICULAR; INTRAMUSCULAR at 16:26

## 2020-06-03 NOTE — PROGRESS NOTES
[facility] [place], [place] [facility] Clinic Note    Subjective     Chief Complaint   Patient presents with   • Follow-up     6 month follow up - Trochanteric bursitis of right hip - injection given 19        HPI    It has been 6  month(s) since Mr. Olvera's last visit. He returns to clinic today for follow-up of right hip pain. He rates his pain a 7/10 on the pain scale. Previous/current treatments: physical therapy. Current symptoms: pain. The pain is worse with walking, standing, climbing stairs and sleeping; pain medication and/or NSAID improve the pain. Overall, he is doing better.  He would like to have an injection today.  Previous injections have helped.    I have reviewed the following portions of the patient's history:History of Present Illness     There is no problem list on file for this patient.    Past Medical History:   Diagnosis Date   • Heart disease    • Hypertension    • Osteoarthritis       Past Surgical History:   Procedure Laterality Date   • CARDIAC SURGERY     • CATARACT EXTRACTION, BILATERAL        Family History   Problem Relation Age of Onset   • Stroke Father    • Osteoarthritis Father      Social History     Socioeconomic History   • Marital status:      Spouse name: Not on file   • Number of children: Not on file   • Years of education: Not on file   • Highest education level: Not on file   Tobacco Use   • Smoking status: Former Smoker     Last attempt to quit:      Years since quittin.4   • Smokeless tobacco: Never Used   Substance and Sexual Activity   • Alcohol use: Yes   • Drug use: No   • Sexual activity: Defer      Current Outpatient Medications on File Prior to Visit   Medication Sig Dispense Refill   • atorvastatin (LIPITOR) 20 MG tablet TK 1 T PO QHS  3   • bisoprolol (ZEBeta) 5 MG tablet Take  by mouth Daily.  2   • brimonidine (ALPHAGAN) 0.2 % ophthalmic solution INSTILL 1 DROP IN BOTH EYES BID  2   • CoenzymeQ10-Isoleucine-Glycine (CO Q-10) 100-50-25 MG tablet  sustained-release 24 hour Co Q-10   qd     • diclofenac (VOLTAREN) 75 MG EC tablet diclofenac sodium 75 mg tablet,delayed release   Two times a day     • doxazosin (CARDURA) 4 MG tablet TK ONE T PO QD  3   • furosemide (LASIX) 40 MG tablet furosemide 40 mg tablet     • ketorolac (ACULAR) 0.5 % ophthalmic solution   2   • levothyroxine (SYNTHROID, LEVOTHROID) 25 MCG tablet levothyroxine 25 mcg tablet   TAKE 1 TABLET BY MOUTH EVERY DAY     • lisinopril-hydrochlorothiazide (PRINZIDE,ZESTORETIC) 10-12.5 MG per tablet TK ONE T PO QD  2   • Nepafenac (ILEVRO) 0.3 % suspension Ilevro 0.3 % eye drops,suspension     • nitroglycerin (NITROSTAT) 0.4 MG SL tablet TK 1 T SUBLINGUALLY AT ONSET OF CHEST PAIN AND REPEAT Q 5 MINUTES FOR 3 DOSES IF NEEDED  3   • omeprazole (priLOSEC) 40 MG capsule TK 1 C PO QD  0   • potassium chloride (K-DUR) 10 MEQ CR tablet Take  by mouth Daily.  2   • prednisoLONE acetate (PRED FORTE) 1 % ophthalmic suspension   2   • Sod Picosulfate-Mag Ox-Cit Acd 10-3.5-12 MG-GM -GM/160ML solution Take 1 kit by mouth Take As Directed. Follow instructions that were mailed to your home. If you didn't receive these call [phone]. 2 bottle 0     No current facility-administered medications on file prior to visit.       Allergies   Allergen Reactions   • Antihistamines, Chlorpheniramine-Type Other (See Comments)        Review of Systems   Constitutional: Negative for activity change, appetite change, chills, diaphoresis, fatigue, fever and unexpected weight change.   HENT: Negative for congestion, dental problem, drooling, ear discharge, ear pain, facial swelling, hearing loss, mouth sores, nosebleeds, postnasal drip, rhinorrhea, sinus pressure, sneezing, sore throat, tinnitus, trouble swallowing and voice change.    Eyes: Negative for photophobia, pain, discharge, redness, itching and visual disturbance.   Respiratory: Negative for apnea, cough, choking, chest tightness, shortness of breath, wheezing and  "stridor.    Cardiovascular: Negative for chest pain, palpitations and leg swelling.   Gastrointestinal: Negative for abdominal distention, abdominal pain, anal bleeding, blood in stool, constipation, diarrhea, nausea, rectal pain and vomiting.   Endocrine: Negative for cold intolerance, heat intolerance, polydipsia, polyphagia and polyuria.   Genitourinary: Negative for decreased urine volume, difficulty urinating, dysuria, enuresis, flank pain, frequency, genital sores, hematuria and urgency.   Musculoskeletal: Positive for joint swelling. Negative for arthralgias, back pain, gait problem, myalgias, neck pain and neck stiffness.   Skin: Negative for color change, pallor, rash and wound.   Allergic/Immunologic: Negative for environmental allergies, food allergies and immunocompromised state.   Neurological: Negative for dizziness, tremors, seizures, syncope, facial asymmetry, speech difficulty, weakness, light-headedness, numbness and headaches.   Hematological: Negative for adenopathy. Does not bruise/bleed easily.   Psychiatric/Behavioral: Negative for agitation, behavioral problems, confusion, decreased concentration, dysphoric mood, hallucinations, self-injury, sleep disturbance and suicidal ideas. The patient is not nervous/anxious and is not hyperactive.         Objective      Physical Exam  Pulse 58   Ht 176.5 cm (69.49\")   Wt 76.7 kg (169 lb 3.2 oz)   SpO2 97%   BMI 24.64 kg/m²     Body mass index is 24.64 kg/m².    General:   Mental Status:  Alert   Appearance: Cooperative, in no acute distress   Build and Nutrition: Well-nourished well-developed male   Orientation: Alert and oriented to person, place and time   Posture: Normal   Gait: Normal    Integument:              Right hip: No skin lesions, no rash, no ecchymosis     Lower Extremity:              Right Hip:                          Tenderness:    Over the lateral aspect of the hip                          Swelling:          None                   "        Crepitus:          None                          Range of motion:        External Rotation:       30°                                                              Internal Rotation:        30°                                                              Flexion:                       100°                                                              Extension:                   0°                       Deformities:     None  Functional testing: [facility]                          No leg length discrepancy      Assessment and Plan     Nicole was seen today for follow-up.    Diagnoses and all orders for this visit:    Trochanteric bursitis of right hip  -     Large Joint Arthrocentesis: R greater trochanteric bursa        1. Trochanteric bursitis of right hip        I reviewed my findings with the patient today.  He continues to have symptoms of trochanteric bursitis, and would like another injection today.  Injection was provided.  I will see him back in 4 months, but sooner for any problems.    Of note, he had 35% improvement just a few minutes following the injection today.    Return in about 4 months (around 10/3/2020).    Medical Decision Making  Management Options : prescription/IM medicine      Fabien Howard MD  06/03/20  16:34    Dragon disclaimer:  Much of this encounter note is an electronic transcription/translation of spoken language to printed text. The electronic translation of spoken language may permit erroneous, or at times, nonsensical words or phrases to be inadvertently transcribed; Although I have reviewed the note for such errors, some may still exist.

## 2020-06-03 NOTE — PROGRESS NOTES
Procedure   Large Joint Arthrocentesis: R greater trochanteric bursa  Date/Time: 6/3/2020 4:26 PM  Consent given by: patient  Site marked: site marked  Timeout: Immediately prior to procedure a time out was called to verify the correct patient, procedure, equipment, support staff and site/side marked as required   Supporting Documentation  Indications: pain   Procedure Details  Location: hip - R greater trochanteric bursa  Preparation: Patient was prepped and draped in the usual sterile fashion  Needle size: 22 G  Approach: anterolateral  Medications administered: 4 mL ropivacaine 0.5 %; 40 mg triamcinolone acetonide 40 MG/ML  Patient tolerance: patient tolerated the procedure well with no immediate complications

## 2020-10-05 ENCOUNTER — OFFICE VISIT (OUTPATIENT)
Dept: ORTHOPEDIC SURGERY | Facility: CLINIC | Age: 85
End: 2020-10-05

## 2020-10-05 VITALS — WEIGHT: 156 LBS | BODY MASS INDEX: 23.11 KG/M2 | HEIGHT: 69 IN | OXYGEN SATURATION: 95 % | HEART RATE: 60 BPM

## 2020-10-05 DIAGNOSIS — M70.61 TROCHANTERIC BURSITIS OF RIGHT HIP: Primary | ICD-10-CM

## 2020-10-05 PROCEDURE — 20610 DRAIN/INJ JOINT/BURSA W/O US: CPT | Performed by: ORTHOPAEDIC SURGERY

## 2020-10-05 RX ORDER — ROPIVACAINE HYDROCHLORIDE 5 MG/ML
4 INJECTION, SOLUTION EPIDURAL; INFILTRATION; PERINEURAL
Status: COMPLETED | OUTPATIENT
Start: 2020-10-05 | End: 2020-10-05

## 2020-10-05 RX ORDER — TRIAMCINOLONE ACETONIDE 40 MG/ML
40 INJECTION, SUSPENSION INTRA-ARTICULAR; INTRAMUSCULAR
Status: COMPLETED | OUTPATIENT
Start: 2020-10-05 | End: 2020-10-05

## 2020-10-05 RX ADMIN — ROPIVACAINE HYDROCHLORIDE 4 ML: 5 INJECTION, SOLUTION EPIDURAL; INFILTRATION; PERINEURAL at 11:39

## 2020-10-05 RX ADMIN — TRIAMCINOLONE ACETONIDE 40 MG: 40 INJECTION, SUSPENSION INTRA-ARTICULAR; INTRAMUSCULAR at 11:39

## 2020-10-05 NOTE — PROGRESS NOTES
Procedure   Large Joint Arthrocentesis: R greater trochanteric bursa  Date/Time: 10/5/2020 11:39 AM  Consent given by: patient  Site marked: site marked  Timeout: Immediately prior to procedure a time out was called to verify the correct patient, procedure, equipment, support staff and site/side marked as required   Supporting Documentation  Indications: pain   Procedure Details  Location: hip - R greater trochanteric bursa  Preparation: Patient was prepped and draped in the usual sterile fashion  Needle size: 22 G  Approach: anterolateral  Medications administered: 40 mg triamcinolone acetonide 40 MG/ML; 4 mL ropivacaine 0.5 %  Patient tolerance: patient tolerated the procedure well with no immediate complications

## 2020-10-05 NOTE — PROGRESS NOTES
[facility] Note    Subjective     Chief Complaint   Patient presents with   • Follow-up     4 months follow up for Trochanteric bursitis of right hip         HPI    It has been 4  month(s) since Mr. Olvera's last visit. He returns to clinic today for follow-up of right hip pain. He rates his pain a 7/10 on the pain scale. Previous/current treatments: physical therapy. Current symptoms: pain. The pain is worse with walking, climbing stairs and sleeping; pain medication and/or NSAID improve the pain. Overall, he is doing better.  He would like to have another injection today.  He has responded to previous injections well.    I have reviewed the following portions of the patient's history and agree with: History of Present Illness and Review of Systems    There is no problem list on file for this patient.    Past Medical History:   Diagnosis Date   • Heart disease    • Hypertension    • Osteoarthritis       Past Surgical History:   Procedure Laterality Date   • CARDIAC SURGERY     • CATARACT EXTRACTION, BILATERAL        Family History   Problem Relation Age of Onset   • Stroke Father    • Osteoarthritis Father      Social History     Socioeconomic History   • Marital status:      Spouse name: Not on file   • Number of children: Not on file   • Years of education: Not on file   • Highest education level: Not on file   Tobacco Use   • Smoking status: Former Smoker     Quit date:      Years since quittin.7   • Smokeless tobacco: Never Used   Substance and Sexual Activity   • Alcohol use: Yes   • Drug use: No   • Sexual activity: Defer      Current Outpatient Medications on File Prior to Visit   Medication Sig Dispense Refill   • atorvastatin (LIPITOR) 20 MG tablet TK 1 T PO QHS  3   • bisoprolol (ZEBeta) 5 MG tablet Take  by mouth Daily.  2   • brimonidine (ALPHAGAN) 0.2 % ophthalmic solution INSTILL 1 DROP IN BOTH EYES BID  2   • CoenzymeQ10-Isoleucine-Glycine (CO Q-10) 100-50-25  "MG tablet sustained-release 24 hour Co Q-10   qd     • diclofenac (VOLTAREN) 75 MG EC tablet diclofenac sodium 75 mg tablet,delayed release   Two times a day     • doxazosin (CARDURA) 4 MG tablet TK ONE T PO QD  3   • furosemide (LASIX) 40 MG tablet furosemide 40 mg tablet     • ketorolac (ACULAR) 0.5 % ophthalmic solution   2   • levothyroxine (SYNTHROID, LEVOTHROID) 25 MCG tablet levothyroxine 25 mcg tablet   TAKE 1 TABLET BY MOUTH EVERY DAY     • lisinopril-hydrochlorothiazide (PRINZIDE,ZESTORETIC) 10-12.5 MG per tablet TK ONE T PO QD  2   • Nepafenac (ILEVRO) 0.3 % suspension Ilevro 0.3 % eye drops,suspension     • nitroglycerin (NITROSTAT) 0.4 MG SL tablet TK 1 T SUBLINGUALLY AT ONSET OF CHEST PAIN AND REPEAT Q 5 MINUTES FOR 3 DOSES IF NEEDED  3   • omeprazole (priLOSEC) 40 MG capsule TK 1 C PO QD  0   • potassium chloride (K-DUR) 10 MEQ CR tablet Take  by mouth Daily.  2   • prednisoLONE acetate (PRED FORTE) 1 % ophthalmic suspension   2   • Sod Picosulfate-Mag Ox-Cit Acd 10-3.5-12 MG-GM -GM/160ML solution Take 1 kit by mouth Take As Directed. Follow instructions that were mailed to your home. If you didn't receive these call [phone]. 2 bottle 0     No current facility-administered medications on file prior to visit.       Allergies   Allergen Reactions   • Antihistamines, Chlorpheniramine-Type Other (See Comments)        Review of Systems   Constitutional: Negative.    HENT: Negative.    Eyes: Negative.    Respiratory: Negative.    Cardiovascular: Negative.    Gastrointestinal: Negative.    Endocrine: Negative.    Genitourinary: Negative.    Musculoskeletal: Positive for arthralgias.   Skin: Negative.    Allergic/Immunologic: Negative.    Neurological: Negative.    Hematological: Negative.    Psychiatric/Behavioral: Negative.         Objective      Physical Exam  Pulse 60   Ht 176.5 cm (69.49\")   Wt 70.8 kg (156 lb)   SpO2 95%   BMI 22.71 kg/m²     Body mass index is 22.71 " kg/m².    General:   Mental Status:  Alert   Appearance: Cooperative, in no acute distress   Build and Nutrition: Well-nourished well-developed male   Orientation: Alert and oriented to person, place and time   Posture: Normal   Gait: Normal    Lower Extremity:              Right Hip:                          Tenderness:    Mild over the lateral aspect of the hip                          Swelling:          None                          Crepitus:          None                          Range of motion:        External Rotation:       30°                                                              Internal Rotation:        30°                                                              Flexion:                       100°                                                              Extension:                   0°                       Deformities:     None  Functional testing: [facility]                          No leg length discrepancy      Assessment and Plan     Nicole was seen today for follow-up.    Diagnoses and all orders for this visit:    Trochanteric bursitis of right hip  -     Large Joint Arthrocentesis: R greater trochanteric bursa    Other orders  -     Cancel: XR Hip With or Without Pelvis 2 - 3 View Right        1. Trochanteric bursitis of right hip        I reviewed my findings with the patient today.  He is bothered with his trochanteric bursitis, and would like to have another injection today.  I will see him back in 6 months, but sooner for any problems.    Procedure Note:  The potential benefits of performing a therapeutic right hip trochanteric bursal injection, as well as potential risks (including, but not limited to infection, swelling, pain, bleeding, bruising, nerve/blood vessel damage, skin color changes, transient elevation in blood glucose levels, and fat atrophy) were discussed with the patient.  After informed consent, timeout procedure was performed, and the skin on the  lateral aspect of the right hip was prepped with chlorhexidine soap and alcohol, after which ethyl chloride was applied to the skin at the injection site. Via the lateral approach, 1ml of Kenalog 40mg/ml mixed with 4ml 0.5% ropivacaine plain was injected into the trochanteric bursa.  The patient tolerated the procedure well, experiencing 10% improvement a few minutes following the injection. There were no complications.  Band-Aid was applied to the injection site. Post-procedural instructions were given to the patient and/or their caregiver.      Return in about 6 months (around 4/5/2021).    Medical Decision Making  Management Options : prescription/IM medicine      Fabien Howard MD  10/05/20  11:56 EDT    Dragon disclaimer:  Much of this encounter note is an electronic transcription/translation of spoken language to printed text. The electronic translation of spoken language may permit erroneous, or at times, nonsensical words or phrases to be inadvertently transcribed; Although I have reviewed the note for such errors, some may still exist.

## 2021-04-05 ENCOUNTER — OFFICE VISIT (OUTPATIENT)
Dept: ORTHOPEDIC SURGERY | Facility: CLINIC | Age: 86
End: 2021-04-05

## 2021-04-05 VITALS
HEIGHT: 69 IN | HEART RATE: 59 BPM | DIASTOLIC BLOOD PRESSURE: 67 MMHG | BODY MASS INDEX: 24.14 KG/M2 | SYSTOLIC BLOOD PRESSURE: 143 MMHG | WEIGHT: 163 LBS

## 2021-04-05 DIAGNOSIS — M25.552 BILATERAL HIP PAIN: Primary | ICD-10-CM

## 2021-04-05 DIAGNOSIS — M25.551 BILATERAL HIP PAIN: Primary | ICD-10-CM

## 2021-04-05 PROCEDURE — 99213 OFFICE O/P EST LOW 20 MIN: CPT | Performed by: ORTHOPAEDIC SURGERY

## 2021-04-05 RX ORDER — METOLAZONE 2.5 MG/1
TABLET ORAL
COMMUNITY

## 2021-04-05 RX ORDER — AMLODIPINE BESYLATE 5 MG/1
5 TABLET ORAL EVERY MORNING
COMMUNITY
Start: 2021-03-02

## 2021-04-05 NOTE — PROGRESS NOTES
[facility]    Subjective     Chief Complaint   Patient presents with   • Left Hip - Pain   • Follow-up     6 month follow up - Trochanteric bursitis of right hip        HPI    Nicole Olvera is a 85 y.o. male who follows up for bilateral hip pain. He has a known history of trochanteric bursitis in his right hip.     He states that his right is worse than his left. The pain is intermittent and located laterally. It can wake him up at night and is worse after walking 0.5 miles. The pain is less frequent now than before. He has done physical therapy for his hips about 1 year ago. He previously had relief for around 3 months from an injection into the right hip. He thinks he has had 4 injections over the past 3 years.     I have reviewed the following portions of the patient's history and agree with: History of Present Illness and Review of Systems    There is no problem list on file for this patient.    Past Medical History:   Diagnosis Date   • Heart disease    • Hypertension    • Osteoarthritis       Past Surgical History:   Procedure Laterality Date   • CARDIAC SURGERY     • CATARACT EXTRACTION, BILATERAL        Family History   Problem Relation Age of Onset   • Stroke Father    • Osteoarthritis Father      Social History     Socioeconomic History   • Marital status:      Spouse name: Not on file   • Number of children: Not on file   • Years of education: Not on file   • Highest education level: Not on file   Tobacco Use   • Smoking status: Former Smoker     Quit date:      Years since quittin.2   • Smokeless tobacco: Never Used   Substance and Sexual Activity   • Alcohol use: Yes   • Drug use: No   • Sexual activity: Defer      Current Outpatient Medications on File Prior to Visit   Medication Sig Dispense Refill   • amLODIPine (NORVASC) 5 MG tablet Take 5 mg by mouth Every Morning.     • atorvastatin (LIPITOR) 20 MG tablet TK 1 T PO QHS  3   • bisoprolol (ZEBeta) 5 MG  tablet Take  by mouth Daily.  2   • brimonidine (ALPHAGAN) 0.2 % ophthalmic solution INSTILL 1 DROP IN BOTH EYES BID  2   • CoenzymeQ10-Isoleucine-Glycine (CO Q-10) 100-50-25 MG tablet sustained-release 24 hour Co Q-10   qd     • diclofenac (VOLTAREN) 75 MG EC tablet diclofenac sodium 75 mg tablet,delayed release   Two times a day     • doxazosin (CARDURA) 4 MG tablet TK ONE T PO QD  3   • furosemide (LASIX) 40 MG tablet furosemide 40 mg tablet     • ketorolac (ACULAR) 0.5 % ophthalmic solution   2   • levothyroxine (SYNTHROID, LEVOTHROID) 25 MCG tablet levothyroxine 25 mcg tablet   TAKE 1 TABLET BY MOUTH EVERY DAY     • lisinopril-hydrochlorothiazide (PRINZIDE,ZESTORETIC) 10-12.5 MG per tablet TK ONE T PO QD  2   • metOLazone (ZAROXOLYN) 2.5 MG tablet metolazone 2.5 mg tablet   TAKE 1 TABLET BY MOUTH EVERY MORNING     • Nepafenac (ILEVRO) 0.3 % suspension Ilevro 0.3 % eye drops,suspension     • nitroglycerin (NITROSTAT) 0.4 MG SL tablet TK 1 T SUBLINGUALLY AT ONSET OF CHEST PAIN AND REPEAT Q 5 MINUTES FOR 3 DOSES IF NEEDED  3   • omeprazole (priLOSEC) 40 MG capsule TK 1 C PO QD  0   • potassium chloride (K-DUR) 10 MEQ CR tablet Take  by mouth Daily.  2   • prednisoLONE acetate (PRED FORTE) 1 % ophthalmic suspension   2   • Sod Picosulfate-Mag Ox-Cit Acd 10-3.5-12 MG-GM -GM/160ML solution Take 1 kit by mouth Take As Directed. Follow instructions that were mailed to your home. If you didn't receive these call [phone]. 2 bottle 0     No current facility-administered medications on file prior to visit.      Allergies   Allergen Reactions   • Antihistamines, Chlorpheniramine-Type Other (See Comments)        Review of Systems   Constitutional: Negative for activity change, appetite change, chills, diaphoresis, fatigue, fever and unexpected weight change.   HENT: Negative for congestion, dental problem, drooling, ear discharge, ear pain, facial swelling, hearing loss, mouth sores, nosebleeds, postnasal drip,  "rhinorrhea, sinus pressure, sneezing, sore throat, tinnitus, trouble swallowing and voice change.    Eyes: Negative for photophobia, pain, discharge, redness, itching and visual disturbance.   Respiratory: Negative for apnea, cough, choking, chest tightness, shortness of breath, wheezing and stridor.    Cardiovascular: Negative for chest pain, palpitations and leg swelling.   Gastrointestinal: Negative for abdominal distention, abdominal pain, anal bleeding, blood in stool, constipation, diarrhea, nausea, rectal pain and vomiting.   Endocrine: Negative for cold intolerance, heat intolerance, polydipsia, polyphagia and polyuria.   Genitourinary: Negative for decreased urine volume, difficulty urinating, dysuria, enuresis, flank pain, frequency, genital sores, hematuria and urgency.   Musculoskeletal: Negative for arthralgias, back pain, gait problem, joint swelling, myalgias, neck pain and neck stiffness.   Skin: Negative for color change, pallor, rash and wound.   Allergic/Immunologic: Negative for environmental allergies, food allergies and immunocompromised state.   Neurological: Negative for dizziness, tremors, seizures, syncope, facial asymmetry, speech difficulty, weakness, light-headedness, numbness and headaches.   Hematological: Negative for adenopathy. Does not bruise/bleed easily.   Psychiatric/Behavioral: Negative for agitation, behavioral problems, confusion, decreased concentration, dysphoric mood, hallucinations, self-injury, sleep disturbance and suicidal ideas. The patient is not nervous/anxious and is not hyperactive.         Objective      Physical Exam  /67   Pulse 59   Ht 176.5 cm (69.49\")   Wt 73.9 kg (163 lb)   BMI 23.73 kg/m²     Body mass index is 23.73 kg/m².    General:   Mental Status:  Alert   Appearance: Cooperative, in no acute distress   Build and Nutrition: Well-nourished well-developed male   Orientation: Alert and oriented to person, place and time   Posture: " Normal   Gait: Slow but nonantalgic     Integument       • Right hip: No skin lesions, rash, or ecchymosis.       • Left hip: No skin lesions, rash, or ecchymosis.    Lower Extremities  • Right Hip:        • Tenderness: No lateral tenderness.        • Swelling: None       • Crepitus: None       • Range of motion:             • External rotation: 30° without pain             • Internal rotation: 30° without pain             • Flexion: 100°             • Extension: 0°       • Deformities: None       • Functional Testing:             • Stinchfield Test: Negative             • No leg length discrepancy.  • Left Hip:        • Tenderness: No lateral tenderness       • Swelling: None       • Crepitus: None       • Range of motion:             • External rotation: 30° without pain             • Internal rotation: 30° without pain             • Flexion: 100°             • Extension: 0°       • Deformities: None       • Functional Testing:             • Stinchfield Test: Negative             • No leg length discrepancy.    Imaging/Studies  Imaging Results (Last 24 Hours)     Procedure Component Value Units Date/Time    XR Hips Bilateral With or Without Pelvis 2 View [[id number]] Resulted: 04/05/21 1344     Updated: 04/05/21 1345    Narrative:      Right Hip Radiographs  Indication: right hip pain  Views: low AP pelvis and lateral of the right hip    Comparison: 8/28/2019    Findings:   Arthritic changes, with joint space narrowing, inferior acetabular   osteophytes, no significant change compared to previous imaging, with no   unusual bony features.    Left Hip Radiographs  Indication: left hip pain  Views: low AP pelvis and lateral of the left hip    Comparison: AP view only, 8/28/2019    Findings:   Arthritic changes, with joint space narrowing, osteophytes at the head   neck junction, with no acute bony abnormalities.              Assessment and Plan     Diagnoses and all orders for this visit:    1. Bilateral hip pain  (Primary)  -     XR Hips Bilateral With or Without Pelvis 2 View        1. Bilateral hip pain        We discussed his bilateral hip x-rays today, which showed some arthritis. However, most of his pain is from bursitis and tendonitis. I recommended we hold off on another cortisone injection, as he has had several and his pain is improving and hips are tolerable at the current time. If his pain worsens, we can do injections at that time.     Return if symptoms worsen or fail to improve.      Scribed for Fabien Howard MD by Ct Thomas.  04/05/21   14:54 EDT    I have personally performed the services described in this document as scribed by the above individual, and it is both accurate and complete.  Fabien Howard MD  4/6/2021  05:30 EDT

## 2022-05-27 ENCOUNTER — TELEPHONE (OUTPATIENT)
Dept: ORTHOPEDIC SURGERY | Facility: CLINIC | Age: 87
End: 2022-05-27

## 2022-05-27 NOTE — TELEPHONE ENCOUNTER
Provider: PEARL  Caller: PETRA  Phone Number: [phone]  Reason for Call: PATIENT IS CALL TO GET SCHEDULE FOR LEFT HIP  INJECTIONS

## 2022-06-20 ENCOUNTER — OFFICE VISIT (OUTPATIENT)
Dept: ORTHOPEDIC SURGERY | Facility: CLINIC | Age: 87
End: 2022-06-20

## 2022-06-20 VITALS
DIASTOLIC BLOOD PRESSURE: 72 MMHG | WEIGHT: 156.2 LBS | SYSTOLIC BLOOD PRESSURE: 130 MMHG | BODY MASS INDEX: 23.13 KG/M2 | HEIGHT: 69 IN

## 2022-06-20 DIAGNOSIS — M70.61 TROCHANTERIC BURSITIS OF RIGHT HIP: Primary | ICD-10-CM

## 2022-06-20 DIAGNOSIS — M16.11 PRIMARY OSTEOARTHRITIS OF RIGHT HIP: ICD-10-CM

## 2022-06-20 PROCEDURE — 99214 OFFICE O/P EST MOD 30 MIN: CPT | Performed by: PHYSICIAN ASSISTANT

## 2022-06-20 PROCEDURE — 20610 DRAIN/INJ JOINT/BURSA W/O US: CPT | Performed by: PHYSICIAN ASSISTANT

## 2022-06-20 RX ORDER — FINASTERIDE 5 MG/1
5 TABLET, FILM COATED ORAL DAILY
COMMUNITY
Start: 2022-05-04

## 2022-06-20 RX ORDER — INDAPAMIDE 2.5 MG/1
2.5 TABLET, FILM COATED ORAL EVERY MORNING
COMMUNITY
Start: 2022-06-16

## 2022-06-20 RX ORDER — LEVOTHYROXINE SODIUM 0.05 MG/1
50 TABLET ORAL DAILY
COMMUNITY
Start: 2022-05-10

## 2022-06-20 RX ORDER — TRIAMCINOLONE ACETONIDE 1 MG/G
CREAM TOPICAL
COMMUNITY
Start: 2022-04-09

## 2022-06-20 RX ORDER — TAMSULOSIN HYDROCHLORIDE 0.4 MG/1
CAPSULE ORAL
COMMUNITY
Start: 2022-04-02

## 2022-06-20 RX ORDER — FERROUS SULFATE 325(65) MG
1 TABLET ORAL DAILY
COMMUNITY
Start: 2022-05-19

## 2022-06-20 RX ORDER — ISOSORBIDE MONONITRATE 60 MG/1
60 TABLET, EXTENDED RELEASE ORAL DAILY
COMMUNITY
Start: 2022-05-04

## 2022-06-20 RX ORDER — FAMOTIDINE 40 MG/1
40 TABLET, FILM COATED ORAL
COMMUNITY
Start: 2022-05-11

## 2022-06-20 RX ORDER — OXYBUTYNIN CHLORIDE 5 MG/1
TABLET, EXTENDED RELEASE ORAL
COMMUNITY
Start: 2022-06-17

## 2022-06-20 RX ORDER — SODIUM FLUORIDE AND POTASSIUM NITRATE 5.8; 57.5 MG/ML; MG/ML
GEL, DENTIFRICE DENTAL
COMMUNITY

## 2022-06-20 RX ADMIN — LIDOCAINE HYDROCHLORIDE 4 ML: 10 INJECTION, SOLUTION EPIDURAL; INFILTRATION; INTRACAUDAL; PERINEURAL at 11:47

## 2022-06-20 RX ADMIN — TRIAMCINOLONE ACETONIDE 40 MG: 40 INJECTION, SUSPENSION INTRA-ARTICULAR; INTRAMUSCULAR at 11:47

## 2022-06-20 NOTE — PROGRESS NOTES
[facility] [place] [facility] Note    Subjective     Chief Complaint   Patient presents with   • Follow-up     Right Hip Pain-trochanteric bursitis, last cortisone injection 10/5/20        HPI  Nicole Olvera is a 86 y.o. male.  Patient returns today due to worsening pain to lateral aspect of right hip but does occasionally note groin pain. Known history of trochanteric bursitis, which he has received steroid injections (help). Last injection 10/5/2020.    Pain scale: 7/10. Worse with walking, sleeping, sitting, movement of joint.     Overall, symptoms are worsening with increasing pain.    No reported fever, chills, night sweats or other constitutional symptoms.    Past Medical History:   Diagnosis Date   • Arthritis of back    • Arthritis of neck    • Heart disease    • Hip arthrosis    • Hypertension    • Osteoarthritis       Past Surgical History:   Procedure Laterality Date   • CARDIAC SURGERY     • CATARACT EXTRACTION, BILATERAL     • PROSTATE SURGERY  2021      Family History   Problem Relation Age of Onset   • Stroke Father    • Osteoarthritis Father      Social History     Socioeconomic History   • Marital status:    Tobacco Use   • Smoking status: Former Smoker     Packs/day: 0.50     Types: Cigarettes     Quit date: 1978     Years since quittin.5   • Smokeless tobacco: Never Used   Substance and Sexual Activity   • Alcohol use: Yes     Alcohol/week: 9.0 standard drinks     Types: 4 Glasses of wine, 5 Drinks containing 0.5 oz of alcohol per week   • Drug use: No   • Sexual activity: Defer      Current Outpatient Medications on File Prior to Visit   Medication Sig Dispense Refill   • amLODIPine (NORVASC) 5 MG tablet Take 5 mg by mouth Every Morning.     • atorvastatin (LIPITOR) 20 MG tablet TK 1 T PO QHS  3   • bisoprolol (ZEBeta) 5 MG tablet Take  by mouth Daily.  2   • brimonidine (ALPHAGAN) 0.2 % ophthalmic solution INSTILL 1 DROP IN BOTH EYES BID  2   •  CoenzymeQ10-Isoleucine-Glycine (CO Q-10) 100-50-25 MG tablet sustained-release 24 hour Co Q-10   qd     • diclofenac (VOLTAREN) 75 MG EC tablet diclofenac sodium 75 mg tablet,delayed release   Two times a day     • famotidine (PEPCID) 40 MG tablet Take 40 mg by mouth every night at bedtime.     • FeroSul 325 (65 Fe) MG tablet Take 1 tablet by mouth Daily.     • finasteride (PROSCAR) 5 MG tablet Take 5 mg by mouth Daily.     • furosemide (LASIX) 40 MG tablet furosemide 40 mg tablet     • indapamide (LOZOL) 2.5 MG tablet Take 2.5 mg by mouth Every Morning.     • isosorbide mononitrate (IMDUR) 60 MG 24 hr tablet Take 60 mg by mouth Daily.     • ketorolac (ACULAR) 0.5 % ophthalmic solution   2   • levothyroxine (SYNTHROID, LEVOTHROID) 50 MCG tablet Take 50 mcg by mouth Daily.     • lisinopril-hydrochlorothiazide (PRINZIDE,ZESTORETIC) 10-12.5 MG per tablet TK ONE T PO QD  2   • metOLazone (ZAROXOLYN) 2.5 MG tablet metolazone 2.5 mg tablet   TAKE 1 TABLET BY MOUTH EVERY MORNING     • Nepafenac 0.3 % suspension Ilevro 0.3 % eye drops,suspension     • nitroglycerin (NITROSTAT) 0.4 MG SL tablet TK 1 T SUBLINGUALLY AT ONSET OF CHEST PAIN AND REPEAT Q 5 MINUTES FOR 3 DOSES IF NEEDED  3   • oxybutynin XL (DITROPAN-XL) 5 MG 24 hr tablet      • potassium chloride (K-DUR) 10 MEQ CR tablet Take  by mouth Daily.  2   • prednisoLONE acetate (PRED FORTE) 1 % ophthalmic suspension   2   • Sod Fluoride-Potassium Nitrate (PreviDent 5000 Sensitive) 1.1-5 % gel PreviDent 5000 Sensitive 1.1 %-5 % dental paste   BRUSH BEFORE BEDTIME. DO NOT RINSE     • Sod Picosulfate-Mag Ox-Cit Acd 10-3.5-12 MG-GM -GM/160ML solution Take 1 kit by mouth Take As Directed. Follow instructions that were mailed to your home. If you didn't receive these call [phone]. 2 bottle 0   • tamsulosin (FLOMAX) 0.4 MG capsule 24 hr capsule TAKE 1 CAPSULE BY MOUTH EVERY DAY AS DIRECTED     • triamcinolone (KENALOG) 0.1 % cream        No current facility-administered  "medications on file prior to visit.      Allergies   Allergen Reactions   • Antihistamines, Chlorpheniramine-Type Other (See Comments)        The following portions of the patient's history were reviewed and updated as appropriate: allergies, current medications, past family history, past medical history, past social history, past surgical history and problem list.    Review of Systems   Constitutional: Negative.    HENT: Negative.    Eyes: Negative.    Respiratory: Negative.    Cardiovascular: Negative.    Gastrointestinal: Negative.    Endocrine: Negative.    Genitourinary: Negative.    Musculoskeletal: Positive for arthralgias.   Skin: Negative.    Allergic/Immunologic: Negative.    Neurological: Negative.    Hematological: Negative.    Psychiatric/Behavioral: Negative.         Objective      Physical Exam  /72   Ht 176.5 cm (69.49\")   Wt 70.9 kg (156 lb 3.2 oz)   BMI 22.74 kg/m²     Body mass index is 22.74 kg/m².    GENERAL APPEARANCE: awake, alert & oriented x 3, in no acute distress and well developed, well nourished  PSYCH: normal mood and affect  LUNGS:  breathing nonlabored, no wheezing  EYES: sclera anicteric, pupils equal  CARDIOVASCULAR: palpable pulses. Capillary refill less than 2 seconds  INTEGUMENTARY: skin intact, no clubbing, cyanosis  NEUROLOGIC:  Normal Sensation         Ortho Exam  Right Hip  Skin: Grossly intact without any erythema, warmth or swelling.  Tenderness: Groin slight discomfort.  Lateral hip/GT positive.   Motion: Flx 110°, internal rotation 30°, external rotation 30°.   Testing: [facility] slight discomfort, Hip flxe to 90° with IR/ER negative.  Strength: Hip flx/ext/abd 5/5, Q/HS 5/5.  Motor: Grossly intact Q/HS/TA/GS/EHL/P.  Sensory: Grossly intact to LT L2-S1.      Imaging/Studies  Ordered right hip plain films.  Imaging read/interpreted by Dr. Howard.    Right Hip Radiographs  Indication: right hip pain  Views: low AP pelvis and lateral of the right hip     Comparison: " 4/5/2021     Findings:   Mild to moderate degenerative changes, with no acute bony abnormalities.  No unusual bony features.  Vascular calcifications.  No significant changes compared to 4/5/2021.    Assessment/Plan        ICD-10-CM ICD-9-CM   1. Trochanteric bursitis of right hip  M70.61 726.5   2. Primary osteoarthritis of right hip  M16.11 715.15       Orders Placed This Encounter   Procedures   • Large Joint Arthrocentesis: L greater trochanteric bursa   • XR Hip With or Without Pelvis 2 - 3 View Right        Right trochanteric bursitis (increasing pain)--Offered and accepted corticosteroid injection.  Injection was given today.  Right hip osteoarthritis--Observe for now.  Discussed stretching exercises.  Recommend OTC NSAIDS/pain medication.  Follow up in 4-6 weeks for repeat evaluation.  Questions and concerns answered.    After discussing the risks, benefits, indications of injection, the patient gave consent to proceed.  His right trochanteric bursa was confirmed as the correct site to be injected with a timeout.  It was then prepped using Hibiclens and injected with a mixture of 4 cc of 1% plain lidocaine and 1 cc of Kenalog (40 mg per mL), without any resistance through the direct lateral approach, patient in seated position.  Area was cleaned, hemostasis was achieved and a Band-Aid was applied over the injection site.  The patient tolerated procedure well.  I instructed the patient on signs and symptoms of infection.  They should report to the emergency department or return to clinic if any of these develop, for further evaluation and treatment.  Recommended modifying activity for the next 48 hours to include rest, ice, elevation and oral pain medication as needed.        Medical Decision Making  Management Options : over-the-counter medicine and prescription/IM medicine  Data/Risk: radiology tests    Monique Prabhakar PA-C  06/27/22  12:42 EDT               EMR Dragon/Transcription disclaimer:  Much  of this encounter note is an electronic transcription of spoken language to printed text. Electronic transcription of spoken language may permit erroneous, or at times, nonsensical words or phrases to be inadvertently transcribed. Although I have reviewed the note for such errors, some may still exist.

## 2022-06-20 NOTE — PROGRESS NOTES
Procedure   Large Joint Arthrocentesis: L greater trochanteric bursa  Date/Time: 6/20/2022 11:47 AM  Consent given by: patient  Site marked: site marked  Timeout: Immediately prior to procedure a time out was called to verify the correct patient, procedure, equipment, support staff and site/side marked as required   Supporting Documentation  Indications: pain   Procedure Details  Location: hip - L greater trochanteric bursa  Preparation: Patient was prepped and draped in the usual sterile fashion  Needle size: 22 G  Approach: lateral  Medications administered: 4 mL lidocaine PF 1% 1 %; 40 mg triamcinolone acetonide 40 MG/ML  Patient tolerance: patient tolerated the procedure well with no immediate complications

## 2022-06-27 RX ORDER — TRIAMCINOLONE ACETONIDE 40 MG/ML
40 INJECTION, SUSPENSION INTRA-ARTICULAR; INTRAMUSCULAR
Status: COMPLETED | OUTPATIENT
Start: 2022-06-20 | End: 2022-06-20

## 2022-06-27 RX ORDER — LIDOCAINE HYDROCHLORIDE 10 MG/ML
4 INJECTION, SOLUTION EPIDURAL; INFILTRATION; INTRACAUDAL; PERINEURAL
Status: COMPLETED | OUTPATIENT
Start: 2022-06-20 | End: 2022-06-20

## 2022-07-25 ENCOUNTER — OFFICE VISIT (OUTPATIENT)
Dept: ORTHOPEDIC SURGERY | Facility: CLINIC | Age: 87
End: 2022-07-25

## 2022-07-25 VITALS
WEIGHT: 156.4 LBS | HEIGHT: 69 IN | BODY MASS INDEX: 23.16 KG/M2 | DIASTOLIC BLOOD PRESSURE: 60 MMHG | SYSTOLIC BLOOD PRESSURE: 124 MMHG

## 2022-07-25 DIAGNOSIS — M70.61 TROCHANTERIC BURSITIS OF RIGHT HIP: Primary | ICD-10-CM

## 2022-07-25 PROCEDURE — 99213 OFFICE O/P EST LOW 20 MIN: CPT | Performed by: PHYSICIAN ASSISTANT

## 2022-07-25 NOTE — PROGRESS NOTES
"    [facility] [place] [facility] Note        Subjective     CC: Follow-up (5 week recheck - Trochanteric bursitis of right hip  and Primary osteoarthritis of right hip  //)      TASHIA Olvera is a 86 y.o. male. Following up after right trochanteric injection 6/20/2022.     Still notes occasional giving away sensation. Walking and sleeping make pain worse but doing better since injection.     Overall, patient's symptoms are improved.    ROS:    Constiutional:Pt denies fever, chills, nausea, or vomiting.  MSK:as above        Objective      Past Medical History  Past Medical History:   Diagnosis Date   • Arthritis of back 2017   • Arthritis of neck 2000   • Heart disease    • Hip arthrosis 2015   • Hypertension    • Osteoarthritis          Physical Exam  /60   Ht 176.5 cm (69.49\")   Wt 70.9 kg (156 lb 6.4 oz)   BMI 22.77 kg/m²     Body mass index is 22.77 kg/m².    Patient is well nourished and well developed.        Ortho Exam  Right Hip  Tenderness: Still slight groin slight discomfort.  Lateral hip/GT improved.   Motion: Flx 110°, internal rotation 30°, external rotation 30°.   Testing: Stinchfield negative, Hip flxe to 90° with IR/ER negative.  Motor/sensory: Grossly intact L2-S1.      Imaging/Labs/EMG Reviewed:  No new imaging today.      Assessment:  1. Trochanteric bursitis of right hip        Plan:  1. Right trochanteric bursitis--improved with injection.  2. Still only slight pain but tolerable.  3. Continue with ROM and stretching exercises.  4. Continue with OTC pain medications as needed.  5. Follow up as needed.  6. Questions and concerns answered.      Monique Prabhakar PA-C  07/28/22  20:26 EDT      Dictated Utilizing Dragon Dictation.  "

## 2023-06-15 ENCOUNTER — TELEPHONE (OUTPATIENT)
Dept: ORTHOPEDIC SURGERY | Facility: CLINIC | Age: 88
End: 2023-06-15

## 2023-06-15 NOTE — TELEPHONE ENCOUNTER
"  Caller: Nicole Olvera \"Les\"    Relationship to patient: Self    Best call back number: [phone]     Type of visit: FOLLOW UP / BILATERAL HIPS, RIGHT > LEFT / ONGOING PAIN, NO NEW INJURY SINCE 07-25-22 APPT & 06-20-22 XR + RIGHT TROCHANTERIC HIP INJECTION / WANTS ADDITIONAL INJECTION & DISCUSS POSSIBLE RIGHT HIP SURGERY     Requested date: UNAVAILABLE 06/26-07/03     Additional notes: PLEASE CALL / LEAVE VMAIL TO DISCUSS SCHEDULING HIP INJECTION APPT     THANKS   "

## 2023-06-19 ENCOUNTER — OFFICE VISIT (OUTPATIENT)
Dept: ORTHOPEDIC SURGERY | Facility: CLINIC | Age: 88
End: 2023-06-19
Payer: MEDICARE

## 2023-06-19 VITALS
WEIGHT: 151 LBS | HEIGHT: 69 IN | SYSTOLIC BLOOD PRESSURE: 122 MMHG | DIASTOLIC BLOOD PRESSURE: 86 MMHG | BODY MASS INDEX: 22.36 KG/M2

## 2023-06-19 DIAGNOSIS — M25.551 PAIN OF RIGHT HIP: Primary | ICD-10-CM

## 2023-06-19 DIAGNOSIS — M70.61 TROCHANTERIC BURSITIS OF RIGHT HIP: ICD-10-CM

## 2023-06-19 DIAGNOSIS — M70.62 TROCHANTERIC BURSITIS, LEFT HIP: ICD-10-CM

## 2023-06-19 RX ORDER — SOLIFENACIN SUCCINATE 5 MG/1
5 TABLET, FILM COATED ORAL DAILY
COMMUNITY
Start: 2023-06-14

## 2023-06-19 RX ORDER — TRIAMCINOLONE ACETONIDE 40 MG/ML
40 INJECTION, SUSPENSION INTRA-ARTICULAR; INTRAMUSCULAR
Status: COMPLETED | OUTPATIENT
Start: 2023-06-19 | End: 2023-06-19

## 2023-06-19 RX ORDER — LIDOCAINE HYDROCHLORIDE 10 MG/ML
4 INJECTION, SOLUTION EPIDURAL; INFILTRATION; INTRACAUDAL; PERINEURAL
Status: COMPLETED | OUTPATIENT
Start: 2023-06-19 | End: 2023-06-19

## 2023-06-19 RX ADMIN — TRIAMCINOLONE ACETONIDE 40 MG: 40 INJECTION, SUSPENSION INTRA-ARTICULAR; INTRAMUSCULAR at 15:12

## 2023-06-19 RX ADMIN — LIDOCAINE HYDROCHLORIDE 4 ML: 10 INJECTION, SOLUTION EPIDURAL; INFILTRATION; INTRACAUDAL; PERINEURAL at 15:12

## 2023-06-19 NOTE — PROGRESS NOTES
Procedure   - Large Joint Arthrocentesis: bilateral greater trochanteric bursa on 6/19/2023 3:12 PM  Indications: pain  Details: 21 G needle, anterolateral approach  Medications (Right): 4 mL lidocaine PF 1% 1 %; 40 mg triamcinolone acetonide 40 MG/ML  Medications (Left): 4 mL lidocaine PF 1% 1 %; 40 mg triamcinolone acetonide 40 MG/ML  Outcome: tolerated well, no immediate complications  Procedure, treatment alternatives, risks and benefits explained, specific risks discussed. Consent was given by the patient. Immediately prior to procedure a time out was called to verify the correct patient, procedure, equipment, support staff and site/side marked as required. Patient was prepped and draped in the usual sterile fashion.

## 2023-06-19 NOTE — PROGRESS NOTES
[facility] [place] [facility] Office Follow Up       Office Follow Up Visit       Patient Name: Nicole Olvera    Chief Complaint:   Chief Complaint   Patient presents with    Follow-up     11 months- Trochanteric bursitis of right hip, 2 years- left hip pain         Referring Physician: No ref. provider found    History of Present Illness:   Nicole Olvera returns to clinic today for bilateral hip pain.  Ongoing for the last couple years.  Rates pain a 6/10.  Worsens when he is lying on his side.  He last saw Monique on 7/25/2022.  Received left troch bursa injection at that time.  Responded well.  He says pain has returned.  Now he also has pain in the right hip.      Subjective     Review of Systems   Constitutional: Negative.  Negative for chills, fatigue and fever.   HENT: Negative.  Negative for congestion and dental problem.    Eyes: Negative.  Negative for blurred vision.   Respiratory: Negative.  Negative for shortness of breath.    Cardiovascular: Negative.  Negative for leg swelling.   Gastrointestinal: Negative.  Negative for abdominal pain.   Endocrine: Negative.  Negative for polyuria.   Genitourinary: Negative.  Negative for difficulty urinating.   Musculoskeletal:  Positive for arthralgias.   Skin: Negative.    Allergic/Immunologic: Negative.    Neurological: Negative.    Hematological: Negative.  Negative for adenopathy.   Psychiatric/Behavioral: Negative.  Negative for behavioral problems.       I have reviewed and updated the following portions of the patient's history and review of systems: allergies, current medications, past family history, past medical history, past social history, past surgical history and problem list.    Medications:   Current Outpatient Medications:     amLODIPine (NORVASC) 5 MG tablet, Take 1 tablet by mouth Every Morning., Disp: , Rfl:     atorvastatin (LIPITOR) 20 MG tablet, TK 1 T PO QHS, Disp: , Rfl: 3     bisoprolol (ZEBeta) 5 MG tablet, Take  by mouth Daily., Disp: , Rfl: 2    brimonidine (ALPHAGAN) 0.2 % ophthalmic solution, INSTILL 1 DROP IN BOTH EYES BID, Disp: , Rfl: 2    CoenzymeQ10-Isoleucine-Glycine (CO Q-10) 100-50-25 MG tablet sustained-release 24 hour, Co Q-10  qd, Disp: , Rfl:     diclofenac (VOLTAREN) 75 MG EC tablet, diclofenac sodium 75 mg tablet,delayed release  Two times a day, Disp: , Rfl:     famotidine (PEPCID) 40 MG tablet, Take 1 tablet by mouth every night at bedtime., Disp: , Rfl:     FeroSul 325 (65 Fe) MG tablet, Take 1 tablet by mouth Daily., Disp: , Rfl:     finasteride (PROSCAR) 5 MG tablet, Take 1 tablet by mouth Daily., Disp: , Rfl:     furosemide (LASIX) 40 MG tablet, furosemide 40 mg tablet, Disp: , Rfl:     indapamide (LOZOL) 2.5 MG tablet, Take 1 tablet by mouth Every Morning., Disp: , Rfl:     isosorbide mononitrate (IMDUR) 60 MG 24 hr tablet, Take 1 tablet by mouth Daily., Disp: , Rfl:     ketorolac (ACULAR) 0.5 % ophthalmic solution, , Disp: , Rfl: 2    levothyroxine (SYNTHROID, LEVOTHROID) 50 MCG tablet, Take 1 tablet by mouth Daily., Disp: , Rfl:     lisinopril-hydrochlorothiazide (PRINZIDE,ZESTORETIC) 10-12.5 MG per tablet, TK ONE T PO QD, Disp: , Rfl: 2    metOLazone (ZAROXOLYN) 2.5 MG tablet, metolazone 2.5 mg tablet  TAKE 1 TABLET BY MOUTH EVERY MORNING, Disp: , Rfl:     Nepafenac 0.3 % suspension, Ilevro 0.3 % eye drops,suspension, Disp: , Rfl:     nitroglycerin (NITROSTAT) 0.4 MG SL tablet, TK 1 T SUBLINGUALLY AT ONSET OF CHEST PAIN AND REPEAT Q 5 MINUTES FOR 3 DOSES IF NEEDED, Disp: , Rfl: 3    potassium chloride (K-DUR) 10 MEQ CR tablet, Take  by mouth Daily., Disp: , Rfl: 2    prednisoLONE acetate (PRED FORTE) 1 % ophthalmic suspension, , Disp: , Rfl: 2    Sod Fluoride-Potassium Nitrate (PreviDent 5000 Sensitive) 1.1-5 % gel, PreviDent 5000 Sensitive 1.1 %-5 % dental paste  BRUSH BEFORE BEDTIME. DO NOT RINSE, Disp: , Rfl:     Sod Picosulfate-Mag Ox-Cit Acd 10-3.5-12  "MG-GM -GM/160ML solution, Take 1 kit by mouth Take As Directed. Follow instructions that were mailed to your home. If you didn't receive these call [phone]., Disp: 2 bottle, Rfl: 0    solifenacin (VESICARE) 5 MG tablet, Take 1 tablet by mouth Daily., Disp: , Rfl:     tamsulosin (FLOMAX) 0.4 MG capsule 24 hr capsule, TAKE 1 CAPSULE BY MOUTH EVERY DAY AS DIRECTED, Disp: , Rfl:     triamcinolone (KENALOG) 0.1 % cream, , Disp: , Rfl:     Allergies:   Allergies   Allergen Reactions    Antihistamines, Chlorpheniramine-Type Other (See Comments)         Objective      Vital Signs:   Vitals:    06/19/23 1435   BP: 122/86   Weight: 68.5 kg (151 lb)   Height: 176.5 cm (69.49\")       Ortho Exam:  RIGHT HIP  RANGE OF MOTION:   FLEXION CONTRACTURE: None  FLEXION: 110 degrees  INTERNAL ROTATION: 30 degrees at 90 degrees of flexion   EXTERNAL ROTATION: 30 degrees at 90 degrees of flexion    PAIN WITH HIP MOTION: no  PAIN WITH LOGROLL: no    KNEE EXAM: full knee ROM (0-120 degrees), stable to varus and valgus stress at terminal extension and 30 degrees flexion     STRENGTH:  5/5 hip adduction, abduction, flexion. 5/5 knee flexion, extension. 5/5 ankle dorsiflexion and plantarflexion.     GREATER TROCHANTER BURSAL PAIN:  yes      SENSATION TO LIGHT TOUCH:  DEEP PERONEAL/SUPERFICIAL PERONEAL/SURAL/SAPHENOUS/TIBIAL:  intact    EDEMA:   no  ERYTHEMA:  no  WOUNDS/INCISIONS:  no      Results Review:  XR Hips Bilateral With or Without Pelvis 3-4 View  Right Hip Radiographs  Indication: right hip pain  Views: low AP pelvis and lateral of the right hip    Comparison: 6/20/2022    Findings:   Mild to moderate degenerative changes, no acute bony abnormalities.  No   new bony features.  No change compared to previous imaging.    Left Hip Radiographs  Indication: left hip pain  Views: low AP pelvis and lateral of the left hip    Comparison: 6/20/2022    Findings:   Mild to moderate degenerative changes, no acute bony abnormalities.  No "   new bony features.  No change compared to previous imaging.       Assessment / Plan      Assessment:   Diagnoses and all orders for this visit:    1. Pain of right hip (Primary)  -     XR Hips Bilateral With or Without Pelvis 3-4 View    2. Trochanteric bursitis of right hip    3. Trochanteric bursitis, left hip          Plan:  X-ray images reviewed with Dr. Howard.  Shows mild degenerative changes bilateral hips.  No acute findings.  We discussed operative versus nonoperative measures.  He is not a candidate for total hip arthroplasty at this time.  Clinically symptoms are more consistent with trochanteric bursitis.  We will proceed with corticosteroid injection into bilateral trochanteric bursa today.    I discussed with the patient the potential benefits of performing a therapeutic injection of the bilateral trochanteric bursa  as well as potential risks including but not limited to infection, swelling, pain, bleeding, bruising, nerve/vessel damage, skin color changes, transient elevation in blood glucose levels, and fat atrophy. After informed consent and verifying correct patient, procedure site, and type of procedure, the area was prepped with Hibiclens, ethyl chloride was used to numb the skin. 4 mL lidocaine PF 1% and 40 mg triamcinolone acetonide 40 MG/ML  was injected into each bursa. The patient tolerated the procedure well. There were no complications.       History, diagnosis and treatment plan discussed with Dr. Howard.      Follow Up:   4 months    Kaity Gibbs PA-C  [facility] [place] [facility]    Dictated using Dragon Speech Recognition.

## 2023-10-19 ENCOUNTER — OFFICE VISIT (OUTPATIENT)
Dept: ORTHOPEDIC SURGERY | Facility: CLINIC | Age: 88
End: 2023-10-19
Payer: MEDICARE

## 2023-10-19 VITALS
HEIGHT: 69 IN | BODY MASS INDEX: 21.86 KG/M2 | SYSTOLIC BLOOD PRESSURE: 126 MMHG | DIASTOLIC BLOOD PRESSURE: 60 MMHG | WEIGHT: 147.6 LBS

## 2023-10-19 DIAGNOSIS — M70.62 TROCHANTERIC BURSITIS, LEFT HIP: ICD-10-CM

## 2023-10-19 DIAGNOSIS — M70.61 TROCHANTERIC BURSITIS OF RIGHT HIP: Primary | ICD-10-CM

## 2023-10-19 PROCEDURE — 20610 DRAIN/INJ JOINT/BURSA W/O US: CPT

## 2023-10-19 RX ORDER — ALLOPURINOL 100 MG/1
1 TABLET ORAL DAILY
COMMUNITY
Start: 2023-09-11

## 2023-10-19 RX ORDER — ROSUVASTATIN CALCIUM 20 MG/1
1 TABLET, COATED ORAL DAILY
COMMUNITY
Start: 2023-08-02

## 2023-10-19 RX ADMIN — TRIAMCINOLONE ACETONIDE 40 MG: 40 INJECTION, SUSPENSION INTRA-ARTICULAR; INTRAMUSCULAR at 10:33

## 2023-10-19 RX ADMIN — LIDOCAINE HYDROCHLORIDE 4 ML: 10 INJECTION, SOLUTION EPIDURAL; INFILTRATION; INTRACAUDAL; PERINEURAL at 10:33

## 2023-10-19 NOTE — PROGRESS NOTES
[facility] [place] [facility] Office Follow Up       Office Follow Up Visit       Patient Name: Nicole Olvera    Chief Complaint:   Chief Complaint   Patient presents with    Follow-up     4 month follow up -- trochanteric bursitis of bilateral hips         Referring Physician: No ref. provider found    History of Present Illness:   Nicole Olvera returns to clinic today for follow-up of bilateral hip pain.  Ongoing for several years.  Last visit on 6/19/2023.  Received corticosteroid injection into bilateral trochanteric bursa at that time.  Responded well.  He says pain has now returned.  Right worse than left.      Subjective     Review of Systems   Constitutional: Negative.  Negative for chills, fatigue and fever.   HENT: Negative.  Negative for congestion and dental problem.    Eyes: Negative.  Negative for blurred vision.   Respiratory: Negative.  Negative for shortness of breath.    Cardiovascular: Negative.  Negative for leg swelling.   Gastrointestinal: Negative.  Negative for abdominal pain.   Endocrine: Negative.  Negative for polyuria.   Genitourinary: Negative.  Negative for difficulty urinating.   Musculoskeletal:  Positive for arthralgias.   Skin: Negative.    Allergic/Immunologic: Negative.    Neurological: Negative.    Hematological: Negative.  Negative for adenopathy.   Psychiatric/Behavioral: Negative.  Negative for behavioral problems.         I have reviewed and updated the following portions of the patient's history and review of systems: allergies, current medications, past family history, past medical history, past social history, past surgical history and problem list.    Medications:   Current Outpatient Medications:     allopurinol (ZYLOPRIM) 100 MG tablet, Take 1 tablet by mouth Daily., Disp: , Rfl:     amLODIPine (NORVASC) 5 MG tablet, Take 1 tablet by mouth Every Morning., Disp: , Rfl:     bisoprolol (ZEBeta) 5 MG tablet, Take   by mouth Daily., Disp: , Rfl: 2    brimonidine (ALPHAGAN) 0.2 % ophthalmic solution, INSTILL 1 DROP IN BOTH EYES BID, Disp: , Rfl: 2    CoenzymeQ10-Isoleucine-Glycine (CO Q-10) 100-50-25 MG tablet sustained-release 24 hour, Co Q-10  qd, Disp: , Rfl:     diclofenac (VOLTAREN) 75 MG EC tablet, diclofenac sodium 75 mg tablet,delayed release  Two times a day, Disp: , Rfl:     famotidine (PEPCID) 40 MG tablet, Take 1 tablet by mouth every night at bedtime., Disp: , Rfl:     FeroSul 325 (65 Fe) MG tablet, Take 1 tablet by mouth Daily., Disp: , Rfl:     finasteride (PROSCAR) 5 MG tablet, Take 1 tablet by mouth Daily., Disp: , Rfl:     furosemide (LASIX) 40 MG tablet, furosemide 40 mg tablet, Disp: , Rfl:     indapamide (LOZOL) 2.5 MG tablet, Take 1 tablet by mouth Every Morning., Disp: , Rfl:     isosorbide mononitrate (IMDUR) 60 MG 24 hr tablet, Take 1 tablet by mouth Daily., Disp: , Rfl:     ketorolac (ACULAR) 0.5 % ophthalmic solution, , Disp: , Rfl: 2    levothyroxine (SYNTHROID, LEVOTHROID) 50 MCG tablet, Take 1 tablet by mouth Daily., Disp: , Rfl:     lisinopril-hydrochlorothiazide (PRINZIDE,ZESTORETIC) 10-12.5 MG per tablet, TK ONE T PO QD, Disp: , Rfl: 2    metOLazone (ZAROXOLYN) 2.5 MG tablet, metolazone 2.5 mg tablet  TAKE 1 TABLET BY MOUTH EVERY MORNING, Disp: , Rfl:     Nepafenac 0.3 % suspension, Ilevro 0.3 % eye drops,suspension, Disp: , Rfl:     nitroglycerin (NITROSTAT) 0.4 MG SL tablet, TK 1 T SUBLINGUALLY AT ONSET OF CHEST PAIN AND REPEAT Q 5 MINUTES FOR 3 DOSES IF NEEDED, Disp: , Rfl: 3    potassium chloride (K-DUR) 10 MEQ CR tablet, Take  by mouth Daily., Disp: , Rfl: 2    prednisoLONE acetate (PRED FORTE) 1 % ophthalmic suspension, , Disp: , Rfl: 2    rosuvastatin (CRESTOR) 20 MG tablet, Take 1 tablet by mouth Daily., Disp: , Rfl:     Sod Fluoride-Potassium Nitrate (PreviDent 5000 Sensitive) 1.1-5 % gel, PreviDent 5000 Sensitive 1.1 %-5 % dental paste  BRUSH BEFORE BEDTIME. DO NOT RINSE, Disp: , Rfl:  "    solifenacin (VESICARE) 5 MG tablet, Take 1 tablet by mouth Daily., Disp: , Rfl:     tamsulosin (FLOMAX) 0.4 MG capsule 24 hr capsule, TAKE 1 CAPSULE BY MOUTH EVERY DAY AS DIRECTED, Disp: , Rfl:     triamcinolone (KENALOG) 0.1 % cream, , Disp: , Rfl:     Allergies:   Allergies   Allergen Reactions    Antihistamines, Chlorpheniramine-Type Other (See Comments)         Objective      Vital Signs:   Vitals:    10/19/23 0958   BP: 126/60   Weight: 67 kg (147 lb 9.6 oz)   Height: 176.5 cm (69.49\")       Ortho Exam:  General: no acute distress, comfortable  Vitals reviewed in chart    Musculoskeletal Exam:    SIDE: Bilateral hips  Tenderness: Tender at greater trochanteric bursa    Range of motion measurements (degrees): 0-90  Painful arc of motion: No  No pain with logroll  No evidence of septic joint      Results Review:  XR Hips Bilateral With or Without Pelvis 3-4 View  Right Hip Radiographs  Indication: right hip pain  Views: low AP pelvis and lateral of the right hip    Comparison: 6/20/2022    Findings:   Mild to moderate degenerative changes, no acute bony abnormalities.  No   new bony features.  No change compared to previous imaging.    Left Hip Radiographs  Indication: left hip pain  Views: low AP pelvis and lateral of the left hip    Comparison: 6/20/2022    Findings:   Mild to moderate degenerative changes, no acute bony abnormalities.  No   new bony features.  No change compared to previous imaging.       Assessment / Plan      Assessment:   Diagnoses and all orders for this visit:    1. Trochanteric bursitis of right hip (Primary)    2. Trochanteric bursitis, left hip    Other orders  -     - Large Joint Arthrocentesis: bilateral greater trochanteric bursa        Quality Metrics:   BMI:   BMI is within normal parameters. No other follow-up for BMI required.       Tobacco:   Nicole Olvera  reports that he quit smoking about 45 years ago. His smoking use included cigarettes. He started smoking about 70 " years ago. He has a 7.50 pack-year smoking history. He has never used smokeless tobacco..     Plan:  We will proceed with repeat corticosteroid injection into greater trochanteric bursa of bilateral hips.    I discussed with the patient the potential benefits of performing a therapeutic injection of the bilateral greater trochanteric bursa as well as potential risks including but not limited to infection, swelling, pain, bleeding, bruising, nerve/vessel damage, skin color changes, transient elevation in blood glucose levels, and fat atrophy. After informed consent and verifying correct patient, procedure site, and type of procedure, the area was prepped with Hibiclens, ethyl chloride was used to numb the skin. Via the lateral approach, 4cc of 1% lidocaine and  40mg/ml of Kenalog were injected into each side. The patient tolerated the procedure well. There were no complications.         Follow Up:   Return in 3-4 months    Kaity Gibbs PA-C  [facility] [place] [facility]    Dictated using Dragon Speech Recognition.

## 2023-10-19 NOTE — PROGRESS NOTES
Procedure   - Large Joint Arthrocentesis: bilateral greater trochanteric bursa on 10/19/2023 10:33 AM  Indications: pain  Details: 21 G needle, lateral approach  Medications (Right): 4 mL lidocaine PF 1% 1 %; 40 mg triamcinolone acetonide 40 MG/ML  Medications (Left): 4 mL lidocaine PF 1% 1 %; 40 mg triamcinolone acetonide 40 MG/ML  Outcome: tolerated well, no immediate complications  Procedure, treatment alternatives, risks and benefits explained, specific risks discussed. Consent was given by the patient. Immediately prior to procedure a time out was called to verify the correct patient, procedure, equipment, support staff and site/side marked as required. Patient was prepped and draped in the usual sterile fashion.

## 2023-10-23 RX ORDER — TRIAMCINOLONE ACETONIDE 40 MG/ML
40 INJECTION, SUSPENSION INTRA-ARTICULAR; INTRAMUSCULAR
Status: COMPLETED | OUTPATIENT
Start: 2023-10-19 | End: 2023-10-19

## 2023-10-23 RX ORDER — LIDOCAINE HYDROCHLORIDE 10 MG/ML
4 INJECTION, SOLUTION EPIDURAL; INFILTRATION; INTRACAUDAL; PERINEURAL
Status: COMPLETED | OUTPATIENT
Start: 2023-10-19 | End: 2023-10-19

## 2024-01-25 ENCOUNTER — OFFICE VISIT (OUTPATIENT)
Dept: ORTHOPEDIC SURGERY | Facility: CLINIC | Age: 89
End: 2024-01-25
Payer: MEDICARE

## 2024-01-25 VITALS
DIASTOLIC BLOOD PRESSURE: 76 MMHG | WEIGHT: 149.2 LBS | HEIGHT: 69 IN | BODY MASS INDEX: 22.1 KG/M2 | SYSTOLIC BLOOD PRESSURE: 130 MMHG

## 2024-01-25 DIAGNOSIS — M70.62 TROCHANTERIC BURSITIS, LEFT HIP: ICD-10-CM

## 2024-01-25 DIAGNOSIS — M70.61 TROCHANTERIC BURSITIS OF RIGHT HIP: Primary | ICD-10-CM

## 2024-01-25 RX ORDER — DOXAZOSIN MESYLATE 4 MG/1
4 TABLET ORAL
COMMUNITY

## 2024-01-25 RX ORDER — TRIAMCINOLONE ACETONIDE 40 MG/ML
1 INJECTION, SUSPENSION INTRA-ARTICULAR; INTRAMUSCULAR
Status: COMPLETED | OUTPATIENT
Start: 2024-01-25 | End: 2024-01-25

## 2024-01-25 RX ORDER — ACYCLOVIR 400 MG/1
400 TABLET ORAL
COMMUNITY

## 2024-01-25 RX ORDER — LIDOCAINE HYDROCHLORIDE 10 MG/ML
4 INJECTION, SOLUTION EPIDURAL; INFILTRATION; INTRACAUDAL; PERINEURAL
Status: COMPLETED | OUTPATIENT
Start: 2024-01-25 | End: 2024-01-25

## 2024-01-25 RX ORDER — ASPIRIN 81 MG/1
81 TABLET ORAL DAILY
COMMUNITY

## 2024-01-25 RX ORDER — OXYBUTYNIN CHLORIDE 10 MG/1
10 TABLET, EXTENDED RELEASE ORAL DAILY
COMMUNITY

## 2024-01-25 RX ORDER — NICOTINE POLACRILEX 2 MG
GUM BUCCAL
COMMUNITY

## 2024-01-25 RX ORDER — ATORVASTATIN CALCIUM 20 MG/1
20 TABLET, FILM COATED ORAL
COMMUNITY

## 2024-01-25 RX ADMIN — TRIAMCINOLONE ACETONIDE 1 ML: 40 INJECTION, SUSPENSION INTRA-ARTICULAR; INTRAMUSCULAR at 10:54

## 2024-01-25 RX ADMIN — LIDOCAINE HYDROCHLORIDE 4 ML: 10 INJECTION, SOLUTION EPIDURAL; INFILTRATION; INTRACAUDAL; PERINEURAL at 10:54

## 2024-01-25 NOTE — PROGRESS NOTES
[facility] [place] [facility] Office Follow Up       Office Follow Up Visit       Patient Name: Nicole Olvera    Chief Complaint:   Chief Complaint   Patient presents with    Follow-up     3 month f/u; Trochanteric bursitis of right hip  2. Trochanteric bursitis, left hip       Referring Physician: No ref. provider found    History of Present Illness:   Nicole Olvera returns to clinic today for bilateral hip pain.  Last visit on 10/19/2023.  Bilateral greater trochanteric bursitis injections at that time.  Responded well.  He says pain is started to return over the last 3 weeks.  He would like repeat injections today.      Subjective     Review of Systems   Constitutional:  Negative for chills, fever, unexpected weight gain and unexpected weight loss.   HENT:  Negative for congestion, postnasal drip and rhinorrhea.    Eyes:  Negative for blurred vision.   Respiratory:  Negative for shortness of breath.    Cardiovascular:  Negative for leg swelling.   Gastrointestinal:  Negative for abdominal pain, nausea and vomiting.   Genitourinary:  Negative for difficulty urinating.   Musculoskeletal:  Positive for arthralgias. Negative for gait problem, joint swelling and myalgias.   Skin:  Negative for skin lesions and wound.   Neurological:  Negative for dizziness, weakness, light-headedness and numbness.   Hematological:  Does not bruise/bleed easily.   Psychiatric/Behavioral:  Negative for depressed mood.         I have reviewed and updated the following portions of the patient's history and review of systems: allergies, current medications, past family history, past medical history, past social history, past surgical history and problem list.    Medications:   Current Outpatient Medications:     acyclovir (ZOVIRAX) 400 MG tablet, 1 tablet., Disp: , Rfl:     allopurinol (ZYLOPRIM) 100 MG tablet, Take 1 tablet by mouth Daily., Disp: , Rfl:     amLODIPine (NORVASC) 5  MG tablet, Take 1 tablet by mouth Every Morning., Disp: , Rfl:     aspirin 81 MG EC tablet, 1 tablet Daily., Disp: , Rfl:     atorvastatin (LIPITOR) 20 MG tablet, 1 tablet., Disp: , Rfl:     Biotin 1 MG capsule, biotin  1 qd, Disp: , Rfl:     bisoprolol (ZEBeta) 5 MG tablet, Take  by mouth Daily., Disp: , Rfl: 2    brimonidine (ALPHAGAN) 0.2 % ophthalmic solution, INSTILL 1 DROP IN BOTH EYES BID, Disp: , Rfl: 2    Cholecalciferol 250 MCG (03640 UT) capsule, Vitamin D3  1 daily, Disp: , Rfl:     CoenzymeQ10-Isoleucine-Glycine (CO Q-10) 100-50-25 MG tablet sustained-release 24 hour, Co Q-10  qd, Disp: , Rfl:     diclofenac (VOLTAREN) 75 MG EC tablet, diclofenac sodium 75 mg tablet,delayed release  Two times a day, Disp: , Rfl:     doxazosin (CARDURA) 4 MG tablet, 1 tablet., Disp: , Rfl:     famotidine (PEPCID) 40 MG tablet, Take 1 tablet by mouth every night at bedtime., Disp: , Rfl:     FeroSul 325 (65 Fe) MG tablet, Take 1 tablet by mouth Daily., Disp: , Rfl:     finasteride (PROSCAR) 5 MG tablet, Take 1 tablet by mouth Daily., Disp: , Rfl:     furosemide (LASIX) 40 MG tablet, furosemide 40 mg tablet, Disp: , Rfl:     indapamide (LOZOL) 2.5 MG tablet, Take 1 tablet by mouth Every Morning., Disp: , Rfl:     isosorbide mononitrate (IMDUR) 60 MG 24 hr tablet, Take 1 tablet by mouth Daily., Disp: , Rfl:     ketorolac (ACULAR) 0.5 % ophthalmic solution, , Disp: , Rfl: 2    levothyroxine (SYNTHROID, LEVOTHROID) 50 MCG tablet, Take 1 tablet by mouth Daily., Disp: , Rfl:     lisinopril-hydrochlorothiazide (PRINZIDE,ZESTORETIC) 10-12.5 MG per tablet, TK ONE T PO QD, Disp: , Rfl: 2    metOLazone (ZAROXOLYN) 2.5 MG tablet, metolazone 2.5 mg tablet  TAKE 1 TABLET BY MOUTH EVERY MORNING, Disp: , Rfl:     Nepafenac 0.3 % suspension, Ilevro 0.3 % eye drops,suspension, Disp: , Rfl:     nitroglycerin (NITROSTAT) 0.4 MG SL tablet, TK 1 T SUBLINGUALLY AT ONSET OF CHEST PAIN AND REPEAT Q 5 MINUTES FOR 3 DOSES IF NEEDED, Disp: , Rfl:  "3    oxybutynin XL (DITROPAN-XL) 10 MG 24 hr tablet, Take 1 tablet by mouth Daily., Disp: , Rfl:     potassium chloride (K-DUR) 10 MEQ CR tablet, Take  by mouth Daily., Disp: , Rfl: 2    prednisoLONE acetate (PRED FORTE) 1 % ophthalmic suspension, , Disp: , Rfl: 2    rosuvastatin (CRESTOR) 20 MG tablet, Take 1 tablet by mouth Daily., Disp: , Rfl:     Sod Fluoride-Potassium Nitrate (PreviDent 5000 Sensitive) 1.1-5 % gel, PreviDent 5000 Sensitive 1.1 %-5 % dental paste  BRUSH BEFORE BEDTIME. DO NOT RINSE, Disp: , Rfl:     solifenacin (VESICARE) 5 MG tablet, Take 1 tablet by mouth Daily., Disp: , Rfl:     tamsulosin (FLOMAX) 0.4 MG capsule 24 hr capsule, TAKE 1 CAPSULE BY MOUTH EVERY DAY AS DIRECTED, Disp: , Rfl:     triamcinolone (KENALOG) 0.1 % cream, , Disp: , Rfl:     TURMERIC PO, turmeric  Daily, Disp: , Rfl:     Allergies:   Allergies   Allergen Reactions    Antihistamines, Chlorpheniramine-Type Other (See Comments)         Objective      Vital Signs:   Vitals:    01/25/24 1052   BP: 130/76   Weight: 67.7 kg (149 lb 3.2 oz)   Height: 176.5 cm (69.49\")       Ortho Exam:  General: no acute distress, comfortable  Vitals reviewed in chart    Musculoskeletal Exam:    SIDE: Bilateral hips    Tenderness: Greater trochanteric bursa bilaterally    Range of motion measurements (degrees): 0-90  Painful arc of motion: No  No evidence of septic joint      Results Review:  XR Hips Bilateral With or Without Pelvis 3-4 View  Right Hip Radiographs  Indication: right hip pain  Views: low AP pelvis and lateral of the right hip    Comparison: 6/20/2022    Findings:   Mild to moderate degenerative changes, no acute bony abnormalities.  No   new bony features.  No change compared to previous imaging.    Left Hip Radiographs  Indication: left hip pain  Views: low AP pelvis and lateral of the left hip    Comparison: 6/20/2022    Findings:   Mild to moderate degenerative changes, no acute bony abnormalities.  No   new bony features.  No " change compared to previous imaging.         Assessment / Plan      Assessment:   Diagnoses and all orders for this visit:    1. Trochanteric bursitis of right hip (Primary)    2. Trochanteric bursitis, left hip    Other orders  -     - Large Joint Arthrocentesis: bilateral greater trochanteric bursa        Quality Metrics:   BMI:   BMI is within normal parameters. No other follow-up for BMI required.       Tobacco:   Nicole Olvera  reports that he quit smoking about 45 years ago. His smoking use included cigarettes. He started smoking about 70 years ago. He has a 7.50 pack-year smoking history. He has never used smokeless tobacco..        Plan:  Repeat bilateral greater trochanteric bursa injections today.     I discussed with the patient the potential benefits of performing a therapeutic injection of the bilateral greater trochanteric bursa as well as potential risks including but not limited to infection, swelling, pain, bleeding, bruising, nerve/vessel damage, skin color changes, transient elevation in blood glucose levels, and fat atrophy. After informed consent and verifying correct patient, procedure site, and type of procedure, the area was prepped with Hibiclens, ethyl chloride was used to numb the skin. Via the lateral approach, 4cc of 1% lidocaine and  40mg/ml of Kenalog were injected into each bursa. The patient tolerated the procedure well. There were no complications.         Follow Up:   4 months    Kaity Victor PA-C  [facility] [place] [facility]    Dictated using Dragon Speech Recognition.

## 2024-01-25 NOTE — PROGRESS NOTES
Procedure   - Large Joint Arthrocentesis: bilateral greater trochanteric bursa on 1/25/2024 10:54 AM  Indications: pain  Details: 21 G needle, lateral approach  Medications (Right): 4 mL lidocaine PF 1% 1 %; 1 mL triamcinolone acetonide 40 MG/ML  Medications (Left): 1 mL triamcinolone acetonide 40 MG/ML; 4 mL lidocaine PF 1% 1 %  Outcome: tolerated well, no immediate complications  Procedure, treatment alternatives, risks and benefits explained, specific risks discussed. Consent was given by the patient. Immediately prior to procedure a time out was called to verify the correct patient, procedure, equipment, support staff and site/side marked as required. Patient was prepped and draped in the usual sterile fashion.

## 2024-03-21 ENCOUNTER — OFFICE VISIT (OUTPATIENT)
Dept: ORTHOPEDIC SURGERY | Facility: CLINIC | Age: 89
End: 2024-03-21
Payer: MEDICARE

## 2024-03-21 VITALS
DIASTOLIC BLOOD PRESSURE: 60 MMHG | SYSTOLIC BLOOD PRESSURE: 142 MMHG | WEIGHT: 145.6 LBS | HEIGHT: 69 IN | BODY MASS INDEX: 21.56 KG/M2

## 2024-03-21 DIAGNOSIS — M70.61 TROCHANTERIC BURSITIS OF RIGHT HIP: ICD-10-CM

## 2024-03-21 DIAGNOSIS — M70.62 TROCHANTERIC BURSITIS, LEFT HIP: ICD-10-CM

## 2024-03-21 DIAGNOSIS — M25.551 PAIN OF RIGHT HIP: Primary | ICD-10-CM

## 2024-03-21 DIAGNOSIS — M16.11 PRIMARY OSTEOARTHRITIS OF RIGHT HIP: ICD-10-CM

## 2024-03-21 NOTE — PROGRESS NOTES
[facility] [place] [facility] Office Follow Up       Office Follow Up Visit       Patient Name: Nicole Olvera    Chief Complaint:   Chief Complaint   Patient presents with    Follow-up     2 month follow up -- trochanteric bursitis of right hip       Referring Physician: No ref. provider found    History of Present Illness:   Nicole Olvera returns to clinic today for follow-up of bilateral hip pain.  Last visit on 1/25/2024.  Bilateral greater trochanteric bursa injections at that time.  He says the injections worked well however he has had increased pain in the right hip over the last 2 weeks.  He says the pain is mostly on the side of his hip but radiates into his groin. Denies injury, trauma or falls.  He has been taking diclofenac daily.      Subjective     Review of Systems   Constitutional: Negative.  Negative for chills, fatigue and fever.   HENT: Negative.  Negative for congestion and dental problem.    Eyes: Negative.  Negative for blurred vision.   Respiratory: Negative.  Negative for shortness of breath.    Cardiovascular: Negative.  Negative for leg swelling.   Gastrointestinal: Negative.  Negative for abdominal pain.   Endocrine: Negative.  Negative for polyuria.   Genitourinary: Negative.  Negative for difficulty urinating.   Musculoskeletal:  Positive for arthralgias.   Skin: Negative.    Allergic/Immunologic: Negative.    Neurological: Negative.    Hematological: Negative.  Negative for adenopathy.   Psychiatric/Behavioral: Negative.  Negative for behavioral problems.         I have reviewed and updated the following portions of the patient's history and review of systems: allergies, current medications, past family history, past medical history, past social history, past surgical history and problem list.    Medications:   Current Outpatient Medications:     acyclovir (ZOVIRAX) 400 MG tablet, 1 tablet., Disp: , Rfl:     allopurinol  (ZYLOPRIM) 100 MG tablet, Take 1 tablet by mouth Daily., Disp: , Rfl:     amLODIPine (NORVASC) 5 MG tablet, Take 1 tablet by mouth Every Morning., Disp: , Rfl:     aspirin 81 MG EC tablet, 1 tablet Daily., Disp: , Rfl:     atorvastatin (LIPITOR) 20 MG tablet, 1 tablet., Disp: , Rfl:     Biotin 1 MG capsule, biotin  1 qd, Disp: , Rfl:     bisoprolol (ZEBeta) 5 MG tablet, Take  by mouth Daily., Disp: , Rfl: 2    brimonidine (ALPHAGAN) 0.2 % ophthalmic solution, INSTILL 1 DROP IN BOTH EYES BID, Disp: , Rfl: 2    Cholecalciferol 250 MCG (74850 UT) capsule, Vitamin D3  1 daily, Disp: , Rfl:     CoenzymeQ10-Isoleucine-Glycine (CO Q-10) 100-50-25 MG tablet sustained-release 24 hour, Co Q-10  qd, Disp: , Rfl:     diclofenac (VOLTAREN) 75 MG EC tablet, diclofenac sodium 75 mg tablet,delayed release  Two times a day, Disp: , Rfl:     doxazosin (CARDURA) 4 MG tablet, 1 tablet., Disp: , Rfl:     famotidine (PEPCID) 40 MG tablet, Take 1 tablet by mouth every night at bedtime., Disp: , Rfl:     FeroSul 325 (65 Fe) MG tablet, Take 1 tablet by mouth Daily., Disp: , Rfl:     finasteride (PROSCAR) 5 MG tablet, Take 1 tablet by mouth Daily., Disp: , Rfl:     furosemide (LASIX) 40 MG tablet, furosemide 40 mg tablet, Disp: , Rfl:     indapamide (LOZOL) 2.5 MG tablet, Take 1 tablet by mouth Every Morning., Disp: , Rfl:     isosorbide mononitrate (IMDUR) 60 MG 24 hr tablet, Take 1 tablet by mouth Daily., Disp: , Rfl:     ketorolac (ACULAR) 0.5 % ophthalmic solution, , Disp: , Rfl: 2    levothyroxine (SYNTHROID, LEVOTHROID) 50 MCG tablet, Take 1 tablet by mouth Daily., Disp: , Rfl:     lisinopril-hydrochlorothiazide (PRINZIDE,ZESTORETIC) 10-12.5 MG per tablet, TK ONE T PO QD, Disp: , Rfl: 2    metOLazone (ZAROXOLYN) 2.5 MG tablet, metolazone 2.5 mg tablet  TAKE 1 TABLET BY MOUTH EVERY MORNING, Disp: , Rfl:     Nepafenac 0.3 % suspension, Ilevro 0.3 % eye drops,suspension, Disp: , Rfl:     nitroglycerin (NITROSTAT) 0.4 MG SL tablet, TK 1 T  "SUBLINGUALLY AT ONSET OF CHEST PAIN AND REPEAT Q 5 MINUTES FOR 3 DOSES IF NEEDED, Disp: , Rfl: 3    oxybutynin XL (DITROPAN-XL) 10 MG 24 hr tablet, Take 1 tablet by mouth Daily., Disp: , Rfl:     potassium chloride (K-DUR) 10 MEQ CR tablet, Take  by mouth Daily., Disp: , Rfl: 2    prednisoLONE acetate (PRED FORTE) 1 % ophthalmic suspension, , Disp: , Rfl: 2    rosuvastatin (CRESTOR) 20 MG tablet, Take 1 tablet by mouth Daily., Disp: , Rfl:     Sod Fluoride-Potassium Nitrate (PreviDent 5000 Sensitive) 1.1-5 % gel, PreviDent 5000 Sensitive 1.1 %-5 % dental paste  BRUSH BEFORE BEDTIME. DO NOT RINSE, Disp: , Rfl:     solifenacin (VESICARE) 5 MG tablet, Take 1 tablet by mouth Daily., Disp: , Rfl:     tamsulosin (FLOMAX) 0.4 MG capsule 24 hr capsule, TAKE 1 CAPSULE BY MOUTH EVERY DAY AS DIRECTED, Disp: , Rfl:     triamcinolone (KENALOG) 0.1 % cream, , Disp: , Rfl:     TURMERIC PO, turmeric  Daily, Disp: , Rfl:     Allergies:   Allergies   Allergen Reactions    Antihistamines, Chlorpheniramine-Type Other (See Comments)         Objective      Vital Signs:   Vitals:    03/21/24 1027   BP: 142/60   Weight: 66 kg (145 lb 9.6 oz)   Height: 176.5 cm (69.49\")       Ortho Exam:  General: no acute distress, comfortable  Vitals reviewed in chart    Musculoskeletal Exam:    SIDE: Right hip    Tenderness: Greater troch bursa    Range of motion measurements (degrees): 0-100  Painful arc of motion: Mild  Pain with external/internal rotation  No pain with log roll  No evidence of septic joint      Results Review:  XR Hips Bilateral With or Without Pelvis 3-4 View  Right Hip Radiographs  Indication: right hip pain  Views: low AP pelvis and lateral of the right hip    Comparison: 6/20/2022    Findings:   Mild to moderate degenerative changes, no acute bony abnormalities.  No   new bony features.  No change compared to previous imaging.    Left Hip Radiographs  Indication: left hip pain  Views: low AP pelvis and lateral of the left " hip    Comparison: 6/20/2022    Findings:   Mild to moderate degenerative changes, no acute bony abnormalities.  No   new bony features.  No change compared to previous imaging.         Assessment / Plan      Assessment:   Diagnoses and all orders for this visit:    1. Pain of right hip (Primary)    2. Trochanteric bursitis of right hip    3. Trochanteric bursitis, left hip    4. Primary osteoarthritis of right hip        Quality Metrics:   BMI:   BMI is within normal parameters. No other follow-up for BMI required.       Tobacco:   Nicole Olvera  reports that he quit smoking about 45 years ago. His smoking use included cigarettes. He started smoking about 71 years ago. He has a 12.7 pack-year smoking history. He has never used smokeless tobacco.           Plan:  Right hip pain secondary to greater trochanteric bursitis as well as osteoarthritis. He is tender laterally over the bursa with groin pain additionally. Counseled regarding difference between pain from bursa versus hip joint.  Discuss nonoperative treatment options including anti-inflammatories, physical therapy, corticosteroid injections.  He will continue with diclofenac orally.  I also recommended trying diclofenac gel as an option.  Recommend physical therapy for the bursitis.  Exercise handout provided today.  It is too early to repeat greater trochanteric bursa injection today.  We will hold for now.  Follow-up in May for repeat injection.      Follow Up:   Follow-up on 5/25/2024 for bilateral greater trochanteric bursa injection    Kaity Victor PA-C  [facility] [place] [facility]    Dictated using Dragon Speech Recognition.

## 2024-05-23 ENCOUNTER — OFFICE VISIT (OUTPATIENT)
Dept: ORTHOPEDIC SURGERY | Facility: CLINIC | Age: 89
End: 2024-05-23
Payer: MEDICARE

## 2024-05-23 VITALS
DIASTOLIC BLOOD PRESSURE: 74 MMHG | HEIGHT: 69 IN | WEIGHT: 145 LBS | BODY MASS INDEX: 21.48 KG/M2 | SYSTOLIC BLOOD PRESSURE: 118 MMHG

## 2024-05-23 DIAGNOSIS — M70.61 TROCHANTERIC BURSITIS OF RIGHT HIP: Primary | ICD-10-CM

## 2024-05-23 DIAGNOSIS — M70.62 TROCHANTERIC BURSITIS, LEFT HIP: ICD-10-CM

## 2024-05-23 RX ORDER — LIDOCAINE HYDROCHLORIDE 10 MG/ML
4 INJECTION, SOLUTION EPIDURAL; INFILTRATION; INTRACAUDAL; PERINEURAL
Status: COMPLETED | OUTPATIENT
Start: 2024-05-23 | End: 2024-05-23

## 2024-05-23 RX ORDER — TRIAMCINOLONE ACETONIDE 40 MG/ML
40 INJECTION, SUSPENSION INTRA-ARTICULAR; INTRAMUSCULAR
Status: COMPLETED | OUTPATIENT
Start: 2024-05-23 | End: 2024-05-23

## 2024-05-23 RX ADMIN — TRIAMCINOLONE ACETONIDE 40 MG: 40 INJECTION, SUSPENSION INTRA-ARTICULAR; INTRAMUSCULAR at 11:26

## 2024-05-23 RX ADMIN — LIDOCAINE HYDROCHLORIDE 4 ML: 10 INJECTION, SOLUTION EPIDURAL; INFILTRATION; INTRACAUDAL; PERINEURAL at 11:26

## 2024-05-23 NOTE — PROGRESS NOTES
[facility] [place] [facility] Follow Up       Office Follow Up Visit       Patient Name: Nicole Olvera    Chief Complaint:   Chief Complaint   Patient presents with    Follow-up     4 month recheck- bilateral hips trochanteric bursitis       Referring Physician: No ref. provider found    History of Present Illness:   Nicole Olvera returns to clinic today for follow-up of bilateral hip pain.  Ongoing for the last several years.  Corticosteroid injection for bilateral hip greater trochanteric bursitis 1/25/2024.  Responded well.  Pain has now returned.      Subjective     Review of Systems   Constitutional: Negative.  Negative for chills, fatigue and fever.   HENT: Negative.  Negative for congestion and dental problem.    Eyes: Negative.  Negative for blurred vision.   Respiratory: Negative.  Negative for shortness of breath.    Cardiovascular: Negative.  Negative for leg swelling.   Gastrointestinal: Negative.  Negative for abdominal pain.   Endocrine: Negative.  Negative for polyuria.   Genitourinary: Negative.  Negative for difficulty urinating.   Musculoskeletal:  Positive for arthralgias.   Skin: Negative.    Allergic/Immunologic: Negative.    Neurological: Negative.    Hematological: Negative.  Negative for adenopathy.   Psychiatric/Behavioral: Negative.  Negative for behavioral problems.         I have reviewed and updated the following portions of the patient's history and review of systems: allergies, current medications, past family history, past medical history, past social history, past surgical history and problem list.    Medications:   Current Outpatient Medications:     acyclovir (ZOVIRAX) 400 MG tablet, 1 tablet., Disp: , Rfl:     allopurinol (ZYLOPRIM) 100 MG tablet, Take 1 tablet by mouth Daily., Disp: , Rfl:     amLODIPine (NORVASC) 5 MG tablet, Take 1 tablet by mouth Every Morning., Disp: , Rfl:     aspirin 81 MG EC tablet, 1 tablet  Daily., Disp: , Rfl:     atorvastatin (LIPITOR) 20 MG tablet, 1 tablet., Disp: , Rfl:     Biotin 1 MG capsule, biotin  1 qd, Disp: , Rfl:     bisoprolol (ZEBeta) 5 MG tablet, Take  by mouth Daily., Disp: , Rfl: 2    brimonidine (ALPHAGAN) 0.2 % ophthalmic solution, INSTILL 1 DROP IN BOTH EYES BID, Disp: , Rfl: 2    Cholecalciferol 250 MCG (78643 UT) capsule, Vitamin D3  1 daily, Disp: , Rfl:     CoenzymeQ10-Isoleucine-Glycine (CO Q-10) 100-50-25 MG tablet sustained-release 24 hour, Co Q-10  qd, Disp: , Rfl:     diclofenac (VOLTAREN) 75 MG EC tablet, diclofenac sodium 75 mg tablet,delayed release  Two times a day, Disp: , Rfl:     doxazosin (CARDURA) 4 MG tablet, 1 tablet., Disp: , Rfl:     famotidine (PEPCID) 40 MG tablet, Take 1 tablet by mouth every night at bedtime., Disp: , Rfl:     FeroSul 325 (65 Fe) MG tablet, Take 1 tablet by mouth Daily., Disp: , Rfl:     finasteride (PROSCAR) 5 MG tablet, Take 1 tablet by mouth Daily., Disp: , Rfl:     furosemide (LASIX) 40 MG tablet, furosemide 40 mg tablet, Disp: , Rfl:     indapamide (LOZOL) 2.5 MG tablet, Take 1 tablet by mouth Every Morning., Disp: , Rfl:     isosorbide mononitrate (IMDUR) 60 MG 24 hr tablet, Take 1 tablet by mouth Daily., Disp: , Rfl:     ketorolac (ACULAR) 0.5 % ophthalmic solution, , Disp: , Rfl: 2    levothyroxine (SYNTHROID, LEVOTHROID) 50 MCG tablet, Take 1 tablet by mouth Daily., Disp: , Rfl:     lisinopril-hydrochlorothiazide (PRINZIDE,ZESTORETIC) 10-12.5 MG per tablet, TK ONE T PO QD, Disp: , Rfl: 2    metOLazone (ZAROXOLYN) 2.5 MG tablet, metolazone 2.5 mg tablet  TAKE 1 TABLET BY MOUTH EVERY MORNING, Disp: , Rfl:     Nepafenac 0.3 % suspension, Ilevro 0.3 % eye drops,suspension, Disp: , Rfl:     nitroglycerin (NITROSTAT) 0.4 MG SL tablet, TK 1 T SUBLINGUALLY AT ONSET OF CHEST PAIN AND REPEAT Q 5 MINUTES FOR 3 DOSES IF NEEDED, Disp: , Rfl: 3    oxybutynin XL (DITROPAN-XL) 10 MG 24 hr tablet, Take 1 tablet by mouth Daily., Disp: , Rfl:      "potassium chloride (K-DUR) 10 MEQ CR tablet, Take  by mouth Daily., Disp: , Rfl: 2    prednisoLONE acetate (PRED FORTE) 1 % ophthalmic suspension, , Disp: , Rfl: 2    rosuvastatin (CRESTOR) 20 MG tablet, Take 1 tablet by mouth Daily., Disp: , Rfl:     solifenacin (VESICARE) 5 MG tablet, Take 1 tablet by mouth Daily., Disp: , Rfl:     tamsulosin (FLOMAX) 0.4 MG capsule 24 hr capsule, TAKE 1 CAPSULE BY MOUTH EVERY DAY AS DIRECTED, Disp: , Rfl:     triamcinolone (KENALOG) 0.1 % cream, , Disp: , Rfl:     TURMERIC PO, turmeric  Daily, Disp: , Rfl:     Allergies:   Allergies   Allergen Reactions    Antihistamines, Chlorpheniramine-Type Other (See Comments)         Objective      Vital Signs:   Vitals:    05/23/24 1125   BP: 118/74   Weight: 65.8 kg (145 lb)   Height: 176.5 cm (69.49\")       Ortho Exam:  General: no acute distress, comfortable  Vitals reviewed in chart    Musculoskeletal Exam:    SIDE: Bilateral hips    Tenderness: Greater trochanteric bursa    Range of motion measurements (degrees): 0-90  Painful arc of motion: No  No skin changes  No evidence of septic joint      Results Review:  XR Hips Bilateral With or Without Pelvis 3-4 View  Right Hip Radiographs  Indication: right hip pain  Views: low AP pelvis and lateral of the right hip    Comparison: 6/20/2022    Findings:   Mild to moderate degenerative changes, no acute bony abnormalities.  No   new bony features.  No change compared to previous imaging.    Left Hip Radiographs  Indication: left hip pain  Views: low AP pelvis and lateral of the left hip    Comparison: 6/20/2022    Findings:   Mild to moderate degenerative changes, no acute bony abnormalities.  No   new bony features.  No change compared to previous imaging.       I have noted results reviewed from previous encounter.      Assessment / Plan      Assessment:   Diagnoses and all orders for this visit:    1. Trochanteric bursitis of right hip (Primary)  -     - Large Joint Arthrocentesis: " bilateral greater trochanteric bursa    2. Trochanteric bursitis, left hip  -     - Large Joint Arthrocentesis: bilateral greater trochanteric bursa        Quality Metrics:   BMI:   BMI is within normal parameters. No other follow-up for BMI required.       Tobacco:   Nicole Olvera  reports that he quit smoking about 45 years ago. His smoking use included cigarettes. He started smoking about 71 years ago. He has a 12.7 pack-year smoking history. He has never used smokeless tobacco.           Plan:  Repeat bilateral hip greater trochanteric bursa injections today.  Return in 4 months.    I discussed with the patient the potential benefits of performing a therapeutic injection of the bilateral hip greater trochanteric bursa as well as potential risks including but not limited to infection, swelling, pain, bleeding, bruising, nerve/vessel damage, skin color changes, transient elevation in blood glucose levels, and fat atrophy. After informed consent and verifying correct patient, procedure site, and type of procedure, the area was prepped with Hibiclens, ethyl chloride was used to numb the skin. Via the lateral approach, 4cc of 1% lidocaine and  40mg/ml of Kenalog were injected into each site. The patient tolerated the procedure well. There were no complications.       Follow Up:   4 months    Kaity Victor PA-C  [facility] [place] [facility]    Dictated using Dragon Speech Recognition.

## 2024-05-23 NOTE — PROGRESS NOTES
Procedure   - Large Joint Arthrocentesis: bilateral greater trochanteric bursa on 5/23/2024 11:26 AM  Indications: pain  Details: 21 G needle, lateral approach  Medications (Right): 4 mL lidocaine PF 1% 1 %; 40 mg triamcinolone acetonide 40 MG/ML  Medications (Left): 4 mL lidocaine PF 1% 1 %; 40 mg triamcinolone acetonide 40 MG/ML  Outcome: tolerated well, no immediate complications  Procedure, treatment alternatives, risks and benefits explained, specific risks discussed. Consent was given by the patient. Immediately prior to procedure a time out was called to verify the correct patient, procedure, equipment, support staff and site/side marked as required. Patient was prepped and draped in the usual sterile fashion.

## 2024-08-05 DIAGNOSIS — M70.61 TROCHANTERIC BURSITIS OF RIGHT HIP: Primary | ICD-10-CM

## 2024-08-05 DIAGNOSIS — M70.62 TROCHANTERIC BURSITIS, LEFT HIP: ICD-10-CM

## 2025-05-09 ENCOUNTER — OFFICE VISIT (OUTPATIENT)
Dept: ORTHOPEDIC SURGERY | Facility: CLINIC | Age: OVER 89
End: 2025-05-09
Payer: MEDICARE

## 2025-05-09 VITALS
WEIGHT: 153 LBS | HEIGHT: 69 IN | SYSTOLIC BLOOD PRESSURE: 124 MMHG | DIASTOLIC BLOOD PRESSURE: 74 MMHG | BODY MASS INDEX: 22.66 KG/M2

## 2025-05-09 DIAGNOSIS — M70.62 TROCHANTERIC BURSITIS, LEFT HIP: ICD-10-CM

## 2025-05-09 DIAGNOSIS — M70.61 TROCHANTERIC BURSITIS OF RIGHT HIP: Primary | ICD-10-CM

## 2025-05-09 RX ORDER — TRIAMCINOLONE ACETONIDE 40 MG/ML
40 INJECTION, SUSPENSION INTRA-ARTICULAR; INTRAMUSCULAR
Status: COMPLETED | OUTPATIENT
Start: 2025-05-09 | End: 2025-05-09

## 2025-05-09 RX ORDER — LIDOCAINE HYDROCHLORIDE 10 MG/ML
4 INJECTION, SOLUTION EPIDURAL; INFILTRATION; INTRACAUDAL; PERINEURAL
Status: COMPLETED | OUTPATIENT
Start: 2025-05-09 | End: 2025-05-09

## 2025-05-09 RX ADMIN — LIDOCAINE HYDROCHLORIDE 4 ML: 10 INJECTION, SOLUTION EPIDURAL; INFILTRATION; INTRACAUDAL; PERINEURAL at 10:45

## 2025-05-09 RX ADMIN — TRIAMCINOLONE ACETONIDE 40 MG: 40 INJECTION, SUSPENSION INTRA-ARTICULAR; INTRAMUSCULAR at 10:45

## 2025-05-09 NOTE — PROGRESS NOTES
[facility] [place] [facility] Office Follow Up       Office Follow Up Visit       Patient Name: Nicole Olvera    Chief Complaint:   Chief Complaint   Patient presents with    Follow-up     1 year follow up - Trochanteric bursitis of right hip       Referring Physician: No ref. provider found    History of Present Illness:   Nicole Olvera returns to clinic today for follow-up of bilateral hip pain.  Ongoing for the last several years.  Last visit on 5/23/2024.  Bilateral greater trochanteric bursa corticosteroid injections at that time.  He reports improvement in symptoms with injection.      He then sustained a fall in August 2024.  X-ray images were obtained at outside facility.  No fracture identified.  Pain worsened.  He started physical therapy which has helped some.      Subjective     Review of Systems     I have reviewed and updated the following portions of the patient's history and review of systems: allergies, current medications, past family history, past medical history, past social history, past surgical history and problem list.    Medications:   Current Outpatient Medications:     acyclovir (ZOVIRAX) 400 MG tablet, 1 tablet., Disp: , Rfl:     allopurinol (ZYLOPRIM) 100 MG tablet, Take 1 tablet by mouth Daily., Disp: , Rfl:     amLODIPine (NORVASC) 5 MG tablet, Take 1 tablet by mouth Every Morning., Disp: , Rfl:     aspirin 81 MG EC tablet, 1 tablet Daily., Disp: , Rfl:     atorvastatin (LIPITOR) 20 MG tablet, 1 tablet., Disp: , Rfl:     Biotin 1 MG capsule, biotin  1 qd, Disp: , Rfl:     bisoprolol (ZEBeta) 5 MG tablet, Take  by mouth Daily., Disp: , Rfl: 2    brimonidine (ALPHAGAN) 0.2 % ophthalmic solution, INSTILL 1 DROP IN BOTH EYES BID, Disp: , Rfl: 2    Cholecalciferol 250 MCG (60534 UT) capsule, Vitamin D3  1 daily, Disp: , Rfl:     CoenzymeQ10-Isoleucine-Glycine (CO Q-10) 100-50-25 MG tablet sustained-release 24 hour, Co Q-10  qd,  Disp: , Rfl:     diclofenac (VOLTAREN) 75 MG EC tablet, diclofenac sodium 75 mg tablet,delayed release  Two times a day, Disp: , Rfl:     doxazosin (CARDURA) 4 MG tablet, 1 tablet., Disp: , Rfl:     famotidine (PEPCID) 40 MG tablet, Take 1 tablet by mouth every night at bedtime., Disp: , Rfl:     FeroSul 325 (65 Fe) MG tablet, Take 1 tablet by mouth Daily., Disp: , Rfl:     finasteride (PROSCAR) 5 MG tablet, Take 1 tablet by mouth Daily., Disp: , Rfl:     furosemide (LASIX) 40 MG tablet, furosemide 40 mg tablet, Disp: , Rfl:     indapamide (LOZOL) 2.5 MG tablet, Take 1 tablet by mouth Every Morning., Disp: , Rfl:     isosorbide mononitrate (IMDUR) 60 MG 24 hr tablet, Take 1 tablet by mouth Daily., Disp: , Rfl:     ketorolac (ACULAR) 0.5 % ophthalmic solution, , Disp: , Rfl: 2    levothyroxine (SYNTHROID, LEVOTHROID) 50 MCG tablet, Take 1 tablet by mouth Daily., Disp: , Rfl:     lisinopril-hydrochlorothiazide (PRINZIDE,ZESTORETIC) 10-12.5 MG per tablet, TK ONE T PO QD, Disp: , Rfl: 2    metOLazone (ZAROXOLYN) 2.5 MG tablet, metolazone 2.5 mg tablet  TAKE 1 TABLET BY MOUTH EVERY MORNING, Disp: , Rfl:     Nepafenac 0.3 % suspension, Ilevro 0.3 % eye drops,suspension, Disp: , Rfl:     nitroglycerin (NITROSTAT) 0.4 MG SL tablet, TK 1 T SUBLINGUALLY AT ONSET OF CHEST PAIN AND REPEAT Q 5 MINUTES FOR 3 DOSES IF NEEDED, Disp: , Rfl: 3    oxybutynin XL (DITROPAN-XL) 10 MG 24 hr tablet, Take 1 tablet by mouth Daily., Disp: , Rfl:     potassium chloride (K-DUR) 10 MEQ CR tablet, Take  by mouth Daily., Disp: , Rfl: 2    prednisoLONE acetate (PRED FORTE) 1 % ophthalmic suspension, , Disp: , Rfl: 2    rosuvastatin (CRESTOR) 20 MG tablet, Take 1 tablet by mouth Daily., Disp: , Rfl:     solifenacin (VESICARE) 5 MG tablet, Take 1 tablet by mouth Daily., Disp: , Rfl:     tamsulosin (FLOMAX) 0.4 MG capsule 24 hr capsule, TAKE 1 CAPSULE BY MOUTH EVERY DAY AS DIRECTED, Disp: , Rfl:     triamcinolone (KENALOG) 0.1 % cream, , Disp: , Rfl:  "    TURMERIC PO, turmeric  Daily, Disp: , Rfl:     Allergies:   Allergies   Allergen Reactions    Antihistamines, Chlorpheniramine-Type Other (See Comments)         Objective      Vital Signs:   Vitals:    05/09/25 0951   BP: 124/74   Weight: 69.4 kg (153 lb)   Height: 176.5 cm (69.49\")       Ortho Exam:  General: no acute distress, comfortable  Vitals reviewed in chart    Musculoskeletal Exam:    SIDE: Bilateral hips    Tenderness: Greater trochanteric bursa    Range of motion measurements (degrees): 0-90  Painful arc of motion: no  No evidence of septic joint      Results Review:  XR Hips Bilateral With or Without Pelvis 3-4 View  Right Hip Radiographs  Indication: right hip pain  Views: low AP pelvis and lateral of the right hip    Comparison: 6/20/2022    Findings:   Mild to moderate degenerative changes, no acute bony abnormalities.  No   new bony features.  No change compared to previous imaging.    Left Hip Radiographs  Indication: left hip pain  Views: low AP pelvis and lateral of the left hip    Comparison: 6/20/2022    Findings:   Mild to moderate degenerative changes, no acute bony abnormalities.  No   new bony features.  No change compared to previous imaging.        Assessment / Plan      Assessment:   Diagnoses and all orders for this visit:    1. Trochanteric bursitis of right hip (Primary)    2. Trochanteric bursitis, left hip    Other orders  -     - Large Joint Arthrocentesis: bilateral greater trochanteric bursa        Quality Metrics:   BMI:   BMI is within normal parameters. No other follow-up for BMI required.       Tobacco:   Nicole Olvera  reports that he quit smoking about 46 years ago. His smoking use included cigarettes. He started smoking about 72 years ago. He has a 25.4 pack-year smoking history. He has never used smokeless tobacco.        Plan:  Bilateral greater trochanteric bursitis has worsened over the last several months.  Recommend corticosteroid injection today for bilateral " greater trochanteric bursa.  Recommend continue with therapy exercises at home.  Recommend Tylenol as needed for pain.    I discussed with the patient the potential benefits of performing a therapeutic injection of the bilateral greater trochanteric bursa as well as potential risks including but not limited to infection, swelling, pain, bleeding, bruising, nerve/vessel damage, skin color changes, transient elevation in blood glucose levels, and fat atrophy. After informed consent and verifying correct patient, procedure site, and type of procedure, the area was prepped with Hibiclens, ethyl chloride was used to numb the skin. Via the lateral approach, 4cc of 1% lidocaine and  40mg/ml of Kenalog were injected into each site. The patient tolerated the procedure well. There were no complications.       Follow Up:   4 months        Kaity Victor PA-C  [facility] [place] [facility]    Dictated using Dragon Speech Recognition.

## 2025-05-09 NOTE — PROGRESS NOTES
Procedure   - Large Joint Arthrocentesis: bilateral greater trochanteric bursa on 5/9/2025 10:45 AM  Indications: pain  Details: 22 G needle, lateral approach  Medications (Right): 4 mL lidocaine PF 1% 1 %; 40 mg triamcinolone acetonide 40 MG/ML  Medications (Left): 4 mL lidocaine PF 1% 1 %; 40 mg triamcinolone acetonide 40 MG/ML  Outcome: tolerated well, no immediate complications  Procedure, treatment alternatives, risks and benefits explained, specific risks discussed. Consent was given by the patient. Immediately prior to procedure a time out was called to verify the correct patient, procedure, equipment, support staff and site/side marked as required. Patient was prepped and draped in the usual sterile fashion.